# Patient Record
Sex: FEMALE | Race: OTHER | NOT HISPANIC OR LATINO
[De-identification: names, ages, dates, MRNs, and addresses within clinical notes are randomized per-mention and may not be internally consistent; named-entity substitution may affect disease eponyms.]

---

## 2018-10-18 ENCOUNTER — APPOINTMENT (OUTPATIENT)
Dept: ALLERGY | Facility: CLINIC | Age: 78
End: 2018-10-18
Payer: MEDICARE

## 2018-10-18 VITALS
RESPIRATION RATE: 14 BRPM | DIASTOLIC BLOOD PRESSURE: 80 MMHG | HEIGHT: 65.5 IN | SYSTOLIC BLOOD PRESSURE: 140 MMHG | WEIGHT: 149 LBS | HEART RATE: 88 BPM | BODY MASS INDEX: 24.53 KG/M2

## 2018-10-18 PROCEDURE — 95004 PERQ TESTS W/ALRGNC XTRCS: CPT

## 2018-10-18 PROCEDURE — 95018 ALL TSTG PERQ&IQ DRUGS/BIOL: CPT

## 2018-10-18 PROCEDURE — 99205 OFFICE O/P NEW HI 60 MIN: CPT | Mod: 25

## 2018-10-18 RX ORDER — OMEPRAZOLE 20 MG/1
TABLET, DELAYED RELEASE ORAL
Refills: 0 | Status: ACTIVE | COMMUNITY

## 2018-10-18 RX ORDER — ALPRAZOLAM 2 MG/1
TABLET ORAL
Refills: 0 | Status: ACTIVE | COMMUNITY

## 2018-10-22 RX ORDER — MUPIROCIN 20 MG/G
2 OINTMENT TOPICAL
Qty: 150 | Refills: 1 | Status: ACTIVE | COMMUNITY
Start: 2018-10-22 | End: 1900-01-01

## 2018-10-25 ENCOUNTER — APPOINTMENT (OUTPATIENT)
Dept: ALLERGY | Facility: CLINIC | Age: 78
End: 2018-10-25
Payer: MEDICARE

## 2018-10-25 VITALS
HEIGHT: 65.5 IN | SYSTOLIC BLOOD PRESSURE: 136 MMHG | DIASTOLIC BLOOD PRESSURE: 80 MMHG | RESPIRATION RATE: 14 BRPM | BODY MASS INDEX: 24.53 KG/M2 | HEART RATE: 72 BPM | WEIGHT: 149 LBS

## 2018-10-25 PROCEDURE — 31231 NASAL ENDOSCOPY DX: CPT

## 2018-10-25 PROCEDURE — 99214 OFFICE O/P EST MOD 30 MIN: CPT | Mod: 25

## 2018-10-25 RX ORDER — BUDESONIDE 0.5 MG/2ML
0.5 INHALANT ORAL TWICE DAILY
Qty: 60 | Refills: 3 | Status: DISCONTINUED | COMMUNITY
Start: 2018-10-18 | End: 2018-10-25

## 2018-10-31 ENCOUNTER — APPOINTMENT (OUTPATIENT)
Dept: ALLERGY | Facility: CLINIC | Age: 78
End: 2018-10-31
Payer: MEDICARE

## 2018-10-31 VITALS
WEIGHT: 149 LBS | BODY MASS INDEX: 24.53 KG/M2 | HEIGHT: 65.5 IN | RESPIRATION RATE: 14 BRPM | SYSTOLIC BLOOD PRESSURE: 136 MMHG | DIASTOLIC BLOOD PRESSURE: 80 MMHG | HEART RATE: 72 BPM

## 2018-10-31 PROCEDURE — 95024 IQ TESTS W/ALLERGENIC XTRCS: CPT

## 2018-10-31 PROCEDURE — 95018 ALL TSTG PERQ&IQ DRUGS/BIOL: CPT

## 2018-10-31 PROCEDURE — 99214 OFFICE O/P EST MOD 30 MIN: CPT | Mod: 25

## 2019-02-22 ENCOUNTER — OUTPATIENT (OUTPATIENT)
Dept: OUTPATIENT SERVICES | Facility: HOSPITAL | Age: 79
LOS: 1 days | End: 2019-02-22
Payer: MEDICARE

## 2019-02-22 VITALS
DIASTOLIC BLOOD PRESSURE: 84 MMHG | OXYGEN SATURATION: 98 % | HEART RATE: 61 BPM | HEIGHT: 65 IN | WEIGHT: 156.09 LBS | SYSTOLIC BLOOD PRESSURE: 164 MMHG | TEMPERATURE: 98 F | RESPIRATION RATE: 14 BRPM

## 2019-02-22 VITALS
DIASTOLIC BLOOD PRESSURE: 78 MMHG | HEART RATE: 68 BPM | SYSTOLIC BLOOD PRESSURE: 136 MMHG | TEMPERATURE: 99 F | RESPIRATION RATE: 12 BRPM | OXYGEN SATURATION: 94 %

## 2019-02-22 DIAGNOSIS — C91.10 CHRONIC LYMPHOCYTIC LEUKEMIA OF B-CELL TYPE NOT HAVING ACHIEVED REMISSION: ICD-10-CM

## 2019-02-22 PROCEDURE — 96365 THER/PROPH/DIAG IV INF INIT: CPT

## 2019-02-22 PROCEDURE — 96375 TX/PRO/DX INJ NEW DRUG ADDON: CPT

## 2019-02-22 PROCEDURE — 96366 THER/PROPH/DIAG IV INF ADDON: CPT

## 2019-02-22 RX ORDER — ACETAMINOPHEN 500 MG
650 TABLET ORAL ONCE
Qty: 0 | Refills: 0 | Status: COMPLETED | OUTPATIENT
Start: 2019-02-22 | End: 2019-02-22

## 2019-02-22 RX ORDER — IMMUNE GLOBULIN (HUMAN) 10 G/100ML
30 INJECTION INTRAVENOUS; SUBCUTANEOUS ONCE
Qty: 0 | Refills: 0 | Status: COMPLETED | OUTPATIENT
Start: 2019-02-22 | End: 2019-02-22

## 2019-02-22 RX ORDER — DIPHENHYDRAMINE HCL 50 MG
25 CAPSULE ORAL ONCE
Qty: 0 | Refills: 0 | Status: COMPLETED | OUTPATIENT
Start: 2019-02-22 | End: 2019-02-22

## 2019-02-22 RX ADMIN — Medication 25 MILLIGRAM(S): at 10:16

## 2019-02-22 RX ADMIN — IMMUNE GLOBULIN (HUMAN) 75 GRAM(S): 10 INJECTION INTRAVENOUS; SUBCUTANEOUS at 10:38

## 2019-02-22 RX ADMIN — Medication 650 MILLIGRAM(S): at 10:16

## 2019-03-22 ENCOUNTER — OUTPATIENT (OUTPATIENT)
Dept: OUTPATIENT SERVICES | Facility: HOSPITAL | Age: 79
LOS: 1 days | End: 2019-03-22
Payer: MEDICARE

## 2019-03-22 VITALS
WEIGHT: 156.97 LBS | RESPIRATION RATE: 18 BRPM | TEMPERATURE: 98 F | HEIGHT: 65 IN | OXYGEN SATURATION: 100 % | HEART RATE: 63 BPM | DIASTOLIC BLOOD PRESSURE: 90 MMHG | SYSTOLIC BLOOD PRESSURE: 158 MMHG

## 2019-03-22 VITALS
DIASTOLIC BLOOD PRESSURE: 75 MMHG | SYSTOLIC BLOOD PRESSURE: 139 MMHG | OXYGEN SATURATION: 97 % | HEART RATE: 61 BPM | RESPIRATION RATE: 14 BRPM | TEMPERATURE: 98 F

## 2019-03-22 DIAGNOSIS — C91.10 CHRONIC LYMPHOCYTIC LEUKEMIA OF B-CELL TYPE NOT HAVING ACHIEVED REMISSION: ICD-10-CM

## 2019-03-22 PROCEDURE — 96375 TX/PRO/DX INJ NEW DRUG ADDON: CPT

## 2019-03-22 PROCEDURE — 96366 THER/PROPH/DIAG IV INF ADDON: CPT

## 2019-03-22 PROCEDURE — 96365 THER/PROPH/DIAG IV INF INIT: CPT

## 2019-03-22 RX ORDER — IMMUNE GLOBULIN (HUMAN) 10 G/100ML
30 INJECTION INTRAVENOUS; SUBCUTANEOUS ONCE
Qty: 0 | Refills: 0 | Status: COMPLETED | OUTPATIENT
Start: 2019-03-22 | End: 2019-03-22

## 2019-03-22 RX ORDER — DIPHENHYDRAMINE HCL 50 MG
50 CAPSULE ORAL ONCE
Qty: 0 | Refills: 0 | Status: COMPLETED | OUTPATIENT
Start: 2019-03-22 | End: 2019-03-22

## 2019-03-22 RX ORDER — ACETAMINOPHEN 500 MG
650 TABLET ORAL ONCE
Qty: 0 | Refills: 0 | Status: COMPLETED | OUTPATIENT
Start: 2019-03-22 | End: 2019-03-22

## 2019-03-22 RX ADMIN — Medication 102 MILLIGRAM(S): at 10:17

## 2019-03-22 RX ADMIN — IMMUNE GLOBULIN (HUMAN) 30 GRAM(S): 10 INJECTION INTRAVENOUS; SUBCUTANEOUS at 13:57

## 2019-03-22 RX ADMIN — IMMUNE GLOBULIN (HUMAN) 75 GRAM(S): 10 INJECTION INTRAVENOUS; SUBCUTANEOUS at 10:59

## 2019-03-22 RX ADMIN — Medication 650 MILLIGRAM(S): at 10:17

## 2019-03-22 RX ADMIN — Medication 50 MILLIGRAM(S): at 10:47

## 2019-04-19 ENCOUNTER — OUTPATIENT (OUTPATIENT)
Dept: OUTPATIENT SERVICES | Facility: HOSPITAL | Age: 79
LOS: 1 days | End: 2019-04-19
Payer: MEDICARE

## 2019-04-19 VITALS
RESPIRATION RATE: 12 BRPM | OXYGEN SATURATION: 97 % | HEART RATE: 67 BPM | TEMPERATURE: 98 F | DIASTOLIC BLOOD PRESSURE: 77 MMHG | SYSTOLIC BLOOD PRESSURE: 133 MMHG

## 2019-04-19 VITALS
OXYGEN SATURATION: 96 % | HEART RATE: 66 BPM | DIASTOLIC BLOOD PRESSURE: 82 MMHG | WEIGHT: 154.98 LBS | HEIGHT: 65 IN | RESPIRATION RATE: 15 BRPM | SYSTOLIC BLOOD PRESSURE: 145 MMHG | TEMPERATURE: 98 F

## 2019-04-19 DIAGNOSIS — C91.10 CHRONIC LYMPHOCYTIC LEUKEMIA OF B-CELL TYPE NOT HAVING ACHIEVED REMISSION: ICD-10-CM

## 2019-04-19 PROCEDURE — 96375 TX/PRO/DX INJ NEW DRUG ADDON: CPT

## 2019-04-19 PROCEDURE — 96366 THER/PROPH/DIAG IV INF ADDON: CPT

## 2019-04-19 PROCEDURE — 96365 THER/PROPH/DIAG IV INF INIT: CPT

## 2019-04-19 RX ORDER — ACETAMINOPHEN 500 MG
650 TABLET ORAL ONCE
Qty: 0 | Refills: 0 | Status: DISCONTINUED | OUTPATIENT
Start: 2019-04-19 | End: 2019-04-19

## 2019-04-19 RX ORDER — IMMUNE GLOBULIN (HUMAN) 10 G/100ML
30 INJECTION INTRAVENOUS; SUBCUTANEOUS ONCE
Qty: 0 | Refills: 0 | Status: DISCONTINUED | OUTPATIENT
Start: 2019-04-19 | End: 2019-04-19

## 2019-04-19 RX ORDER — IMMUNE GLOBULIN (HUMAN) 10 G/100ML
30 INJECTION INTRAVENOUS; SUBCUTANEOUS ONCE
Qty: 0 | Refills: 0 | Status: COMPLETED | OUTPATIENT
Start: 2019-04-19 | End: 2019-04-19

## 2019-04-19 RX ORDER — DIPHENHYDRAMINE HCL 50 MG
25 CAPSULE ORAL ONCE
Qty: 0 | Refills: 0 | Status: DISCONTINUED | OUTPATIENT
Start: 2019-04-19 | End: 2019-04-19

## 2019-04-19 RX ORDER — ACETAMINOPHEN 500 MG
650 TABLET ORAL ONCE
Qty: 0 | Refills: 0 | Status: COMPLETED | OUTPATIENT
Start: 2019-04-19 | End: 2019-04-19

## 2019-04-19 RX ORDER — DIPHENHYDRAMINE HCL 50 MG
25 CAPSULE ORAL ONCE
Qty: 0 | Refills: 0 | Status: COMPLETED | OUTPATIENT
Start: 2019-04-19 | End: 2019-04-19

## 2019-04-19 RX ADMIN — IMMUNE GLOBULIN (HUMAN) 75 GRAM(S): 10 INJECTION INTRAVENOUS; SUBCUTANEOUS at 10:35

## 2019-04-19 RX ADMIN — Medication 650 MILLIGRAM(S): at 10:09

## 2019-04-19 RX ADMIN — Medication 25 MILLIGRAM(S): at 10:09

## 2019-04-19 RX ADMIN — IMMUNE GLOBULIN (HUMAN) 30 GRAM(S): 10 INJECTION INTRAVENOUS; SUBCUTANEOUS at 13:23

## 2019-05-17 ENCOUNTER — OUTPATIENT (OUTPATIENT)
Dept: OUTPATIENT SERVICES | Facility: HOSPITAL | Age: 79
LOS: 1 days | End: 2019-05-17
Payer: MEDICARE

## 2019-05-17 VITALS
HEART RATE: 67 BPM | DIASTOLIC BLOOD PRESSURE: 82 MMHG | WEIGHT: 154.1 LBS | OXYGEN SATURATION: 96 % | HEIGHT: 66 IN | RESPIRATION RATE: 14 BRPM | SYSTOLIC BLOOD PRESSURE: 146 MMHG | TEMPERATURE: 98 F

## 2019-05-17 VITALS
SYSTOLIC BLOOD PRESSURE: 137 MMHG | RESPIRATION RATE: 14 BRPM | TEMPERATURE: 98 F | OXYGEN SATURATION: 97 % | HEART RATE: 68 BPM | DIASTOLIC BLOOD PRESSURE: 75 MMHG

## 2019-05-17 DIAGNOSIS — C91.10 CHRONIC LYMPHOCYTIC LEUKEMIA OF B-CELL TYPE NOT HAVING ACHIEVED REMISSION: ICD-10-CM

## 2019-05-17 PROCEDURE — 96375 TX/PRO/DX INJ NEW DRUG ADDON: CPT

## 2019-05-17 PROCEDURE — 96365 THER/PROPH/DIAG IV INF INIT: CPT

## 2019-05-17 PROCEDURE — 96366 THER/PROPH/DIAG IV INF ADDON: CPT

## 2019-05-17 RX ORDER — DIPHENHYDRAMINE HCL 50 MG
25 CAPSULE ORAL ONCE
Refills: 0 | Status: COMPLETED | OUTPATIENT
Start: 2019-05-17 | End: 2019-05-17

## 2019-05-17 RX ORDER — ACETAMINOPHEN 500 MG
650 TABLET ORAL ONCE
Refills: 0 | Status: COMPLETED | OUTPATIENT
Start: 2019-05-17 | End: 2019-05-17

## 2019-05-17 RX ORDER — IMMUNE GLOBULIN (HUMAN) 10 G/100ML
30 INJECTION INTRAVENOUS; SUBCUTANEOUS ONCE
Refills: 0 | Status: COMPLETED | OUTPATIENT
Start: 2019-05-17 | End: 2019-05-17

## 2019-05-17 RX ADMIN — IMMUNE GLOBULIN (HUMAN) 75 GRAM(S): 10 INJECTION INTRAVENOUS; SUBCUTANEOUS at 11:09

## 2019-05-17 RX ADMIN — Medication 25 MILLIGRAM(S): at 10:59

## 2019-05-17 RX ADMIN — Medication 650 MILLIGRAM(S): at 10:59

## 2019-05-17 RX ADMIN — IMMUNE GLOBULIN (HUMAN) 30 GRAM(S): 10 INJECTION INTRAVENOUS; SUBCUTANEOUS at 13:00

## 2019-06-14 ENCOUNTER — OUTPATIENT (OUTPATIENT)
Dept: OUTPATIENT SERVICES | Facility: HOSPITAL | Age: 79
LOS: 1 days | End: 2019-06-14
Payer: MEDICARE

## 2019-06-14 VITALS
HEART RATE: 59 BPM | RESPIRATION RATE: 12 BRPM | OXYGEN SATURATION: 98 % | DIASTOLIC BLOOD PRESSURE: 92 MMHG | SYSTOLIC BLOOD PRESSURE: 167 MMHG | TEMPERATURE: 98 F

## 2019-06-14 VITALS
RESPIRATION RATE: 16 BRPM | HEART RATE: 57 BPM | OXYGEN SATURATION: 98 % | SYSTOLIC BLOOD PRESSURE: 163 MMHG | TEMPERATURE: 98 F | DIASTOLIC BLOOD PRESSURE: 89 MMHG

## 2019-06-14 DIAGNOSIS — C91.10 CHRONIC LYMPHOCYTIC LEUKEMIA OF B-CELL TYPE NOT HAVING ACHIEVED REMISSION: ICD-10-CM

## 2019-06-14 PROCEDURE — 96366 THER/PROPH/DIAG IV INF ADDON: CPT

## 2019-06-14 PROCEDURE — 96365 THER/PROPH/DIAG IV INF INIT: CPT

## 2019-06-14 PROCEDURE — 96375 TX/PRO/DX INJ NEW DRUG ADDON: CPT

## 2019-06-14 RX ORDER — IMMUNE GLOBULIN (HUMAN) 10 G/100ML
30 INJECTION INTRAVENOUS; SUBCUTANEOUS ONCE
Refills: 0 | Status: COMPLETED | OUTPATIENT
Start: 2019-06-14 | End: 2019-06-14

## 2019-06-14 RX ORDER — ACETAMINOPHEN 500 MG
650 TABLET ORAL ONCE
Refills: 0 | Status: COMPLETED | OUTPATIENT
Start: 2019-06-14 | End: 2019-06-14

## 2019-06-14 RX ORDER — DIPHENHYDRAMINE HCL 50 MG
25 CAPSULE ORAL ONCE
Refills: 0 | Status: COMPLETED | OUTPATIENT
Start: 2019-06-14 | End: 2019-06-14

## 2019-06-14 RX ADMIN — IMMUNE GLOBULIN (HUMAN) 75 GRAM(S): 10 INJECTION INTRAVENOUS; SUBCUTANEOUS at 11:03

## 2019-06-14 RX ADMIN — Medication 650 MILLIGRAM(S): at 10:55

## 2019-06-14 RX ADMIN — Medication 25 MILLIGRAM(S): at 10:55

## 2019-06-14 RX ADMIN — IMMUNE GLOBULIN (HUMAN) 30 GRAM(S): 10 INJECTION INTRAVENOUS; SUBCUTANEOUS at 14:30

## 2019-08-09 ENCOUNTER — OUTPATIENT (OUTPATIENT)
Dept: OUTPATIENT SERVICES | Facility: HOSPITAL | Age: 79
LOS: 1 days | End: 2019-08-09
Payer: MEDICARE

## 2019-08-09 VITALS
HEART RATE: 61 BPM | OXYGEN SATURATION: 95 % | TEMPERATURE: 98 F | WEIGHT: 158.07 LBS | SYSTOLIC BLOOD PRESSURE: 144 MMHG | DIASTOLIC BLOOD PRESSURE: 80 MMHG | RESPIRATION RATE: 14 BRPM | HEIGHT: 66 IN

## 2019-08-09 VITALS
HEART RATE: 63 BPM | OXYGEN SATURATION: 97 % | DIASTOLIC BLOOD PRESSURE: 74 MMHG | RESPIRATION RATE: 12 BRPM | SYSTOLIC BLOOD PRESSURE: 132 MMHG | TEMPERATURE: 98 F

## 2019-08-09 DIAGNOSIS — C91.10 CHRONIC LYMPHOCYTIC LEUKEMIA OF B-CELL TYPE NOT HAVING ACHIEVED REMISSION: ICD-10-CM

## 2019-08-09 PROCEDURE — 96413 CHEMO IV INFUSION 1 HR: CPT

## 2019-08-09 PROCEDURE — 96415 CHEMO IV INFUSION ADDL HR: CPT

## 2019-08-09 RX ORDER — IMMUNE GLOBULIN (HUMAN) 10 G/100ML
30 INJECTION INTRAVENOUS; SUBCUTANEOUS ONCE
Refills: 0 | Status: COMPLETED | OUTPATIENT
Start: 2019-08-09 | End: 2019-08-09

## 2019-08-09 RX ORDER — ACETAMINOPHEN 500 MG
650 TABLET ORAL ONCE
Refills: 0 | Status: COMPLETED | OUTPATIENT
Start: 2019-08-09 | End: 2019-08-09

## 2019-08-09 RX ORDER — DIPHENHYDRAMINE HCL 50 MG
25 CAPSULE ORAL ONCE
Refills: 0 | Status: COMPLETED | OUTPATIENT
Start: 2019-08-09 | End: 2019-08-09

## 2019-08-09 RX ADMIN — Medication 25 MILLIGRAM(S): at 10:33

## 2019-08-09 RX ADMIN — IMMUNE GLOBULIN (HUMAN) 50 GRAM(S): 10 INJECTION INTRAVENOUS; SUBCUTANEOUS at 11:08

## 2019-08-09 RX ADMIN — IMMUNE GLOBULIN (HUMAN) 30 GRAM(S): 10 INJECTION INTRAVENOUS; SUBCUTANEOUS at 14:18

## 2019-08-09 RX ADMIN — Medication 650 MILLIGRAM(S): at 10:33

## 2019-09-06 ENCOUNTER — OUTPATIENT (OUTPATIENT)
Dept: OUTPATIENT SERVICES | Facility: HOSPITAL | Age: 79
LOS: 1 days | End: 2019-09-06
Payer: MEDICARE

## 2019-09-06 VITALS
HEART RATE: 58 BPM | RESPIRATION RATE: 14 BRPM | DIASTOLIC BLOOD PRESSURE: 81 MMHG | SYSTOLIC BLOOD PRESSURE: 148 MMHG | OXYGEN SATURATION: 99 % | TEMPERATURE: 98 F

## 2019-09-06 VITALS
HEIGHT: 66 IN | OXYGEN SATURATION: 97 % | WEIGHT: 158.07 LBS | TEMPERATURE: 98 F | SYSTOLIC BLOOD PRESSURE: 149 MMHG | RESPIRATION RATE: 16 BRPM | HEART RATE: 64 BPM | DIASTOLIC BLOOD PRESSURE: 81 MMHG

## 2019-09-06 DIAGNOSIS — C91.10 CHRONIC LYMPHOCYTIC LEUKEMIA OF B-CELL TYPE NOT HAVING ACHIEVED REMISSION: ICD-10-CM

## 2019-09-06 PROCEDURE — 96365 THER/PROPH/DIAG IV INF INIT: CPT

## 2019-09-06 PROCEDURE — 96366 THER/PROPH/DIAG IV INF ADDON: CPT

## 2019-09-06 RX ORDER — DIPHENHYDRAMINE HCL 50 MG
25 CAPSULE ORAL ONCE
Refills: 0 | Status: COMPLETED | OUTPATIENT
Start: 2019-09-06 | End: 2019-09-06

## 2019-09-06 RX ORDER — ACETAMINOPHEN 500 MG
650 TABLET ORAL ONCE
Refills: 0 | Status: COMPLETED | OUTPATIENT
Start: 2019-09-06 | End: 2019-09-06

## 2019-09-06 RX ORDER — IMMUNE GLOBULIN (HUMAN) 10 G/100ML
30 INJECTION INTRAVENOUS; SUBCUTANEOUS ONCE
Refills: 0 | Status: COMPLETED | OUTPATIENT
Start: 2019-09-06 | End: 2019-09-06

## 2019-09-06 RX ADMIN — Medication 650 MILLIGRAM(S): at 11:12

## 2019-09-06 RX ADMIN — IMMUNE GLOBULIN (HUMAN) 30 GRAM(S): 10 INJECTION INTRAVENOUS; SUBCUTANEOUS at 14:03

## 2019-09-06 RX ADMIN — IMMUNE GLOBULIN (HUMAN) 50 GRAM(S): 10 INJECTION INTRAVENOUS; SUBCUTANEOUS at 11:28

## 2019-09-06 RX ADMIN — Medication 25 MILLIGRAM(S): at 11:12

## 2019-10-04 ENCOUNTER — OUTPATIENT (OUTPATIENT)
Dept: OUTPATIENT SERVICES | Facility: HOSPITAL | Age: 79
LOS: 1 days | End: 2019-10-04
Payer: MEDICARE

## 2019-10-04 VITALS
SYSTOLIC BLOOD PRESSURE: 128 MMHG | OXYGEN SATURATION: 96 % | RESPIRATION RATE: 12 BRPM | HEART RATE: 61 BPM | TEMPERATURE: 98 F | DIASTOLIC BLOOD PRESSURE: 70 MMHG

## 2019-10-04 VITALS
OXYGEN SATURATION: 98 % | TEMPERATURE: 98 F | DIASTOLIC BLOOD PRESSURE: 83 MMHG | HEART RATE: 61 BPM | SYSTOLIC BLOOD PRESSURE: 147 MMHG | HEIGHT: 66 IN | RESPIRATION RATE: 14 BRPM | WEIGHT: 156.09 LBS

## 2019-10-04 DIAGNOSIS — C91.10 CHRONIC LYMPHOCYTIC LEUKEMIA OF B-CELL TYPE NOT HAVING ACHIEVED REMISSION: ICD-10-CM

## 2019-10-04 PROCEDURE — 96365 THER/PROPH/DIAG IV INF INIT: CPT

## 2019-10-04 PROCEDURE — 96366 THER/PROPH/DIAG IV INF ADDON: CPT

## 2019-10-04 RX ORDER — ACETAMINOPHEN 500 MG
650 TABLET ORAL ONCE
Refills: 0 | Status: COMPLETED | OUTPATIENT
Start: 2019-10-04 | End: 2019-10-04

## 2019-10-04 RX ORDER — DIPHENHYDRAMINE HCL 50 MG
25 CAPSULE ORAL ONCE
Refills: 0 | Status: COMPLETED | OUTPATIENT
Start: 2019-10-04 | End: 2019-10-04

## 2019-10-04 RX ORDER — IMMUNE GLOBULIN (HUMAN) 10 G/100ML
30 INJECTION INTRAVENOUS; SUBCUTANEOUS ONCE
Refills: 0 | Status: COMPLETED | OUTPATIENT
Start: 2019-10-04 | End: 2019-10-04

## 2019-10-04 RX ADMIN — IMMUNE GLOBULIN (HUMAN) 50 GRAM(S): 10 INJECTION INTRAVENOUS; SUBCUTANEOUS at 10:48

## 2019-10-04 RX ADMIN — IMMUNE GLOBULIN (HUMAN) 30 GRAM(S): 10 INJECTION INTRAVENOUS; SUBCUTANEOUS at 14:10

## 2019-10-04 RX ADMIN — Medication 25 MILLIGRAM(S): at 10:34

## 2019-10-04 RX ADMIN — Medication 650 MILLIGRAM(S): at 10:34

## 2019-11-01 ENCOUNTER — OUTPATIENT (OUTPATIENT)
Dept: OUTPATIENT SERVICES | Facility: HOSPITAL | Age: 79
LOS: 1 days | End: 2019-11-01
Payer: MEDICARE

## 2019-11-01 VITALS
OXYGEN SATURATION: 99 % | HEART RATE: 76 BPM | SYSTOLIC BLOOD PRESSURE: 164 MMHG | RESPIRATION RATE: 16 BRPM | TEMPERATURE: 98 F | WEIGHT: 154.98 LBS | DIASTOLIC BLOOD PRESSURE: 95 MMHG | HEIGHT: 66 IN

## 2019-11-01 VITALS
DIASTOLIC BLOOD PRESSURE: 78 MMHG | RESPIRATION RATE: 14 BRPM | TEMPERATURE: 98 F | SYSTOLIC BLOOD PRESSURE: 135 MMHG | OXYGEN SATURATION: 95 % | HEART RATE: 66 BPM

## 2019-11-01 DIAGNOSIS — C91.10 CHRONIC LYMPHOCYTIC LEUKEMIA OF B-CELL TYPE NOT HAVING ACHIEVED REMISSION: ICD-10-CM

## 2019-11-01 PROCEDURE — 96365 THER/PROPH/DIAG IV INF INIT: CPT

## 2019-11-01 PROCEDURE — 96366 THER/PROPH/DIAG IV INF ADDON: CPT

## 2019-11-01 RX ORDER — ACETAMINOPHEN 500 MG
650 TABLET ORAL ONCE
Refills: 0 | Status: COMPLETED | OUTPATIENT
Start: 2019-11-01 | End: 2019-11-01

## 2019-11-01 RX ORDER — DIPHENHYDRAMINE HCL 50 MG
25 CAPSULE ORAL ONCE
Refills: 0 | Status: COMPLETED | OUTPATIENT
Start: 2019-11-01 | End: 2019-11-01

## 2019-11-01 RX ORDER — IMMUNE GLOBULIN (HUMAN) 10 G/100ML
30 INJECTION INTRAVENOUS; SUBCUTANEOUS ONCE
Refills: 0 | Status: COMPLETED | OUTPATIENT
Start: 2019-11-01 | End: 2019-11-01

## 2019-11-01 RX ADMIN — Medication 650 MILLIGRAM(S): at 10:14

## 2019-11-01 RX ADMIN — IMMUNE GLOBULIN (HUMAN) 30 GRAM(S): 10 INJECTION INTRAVENOUS; SUBCUTANEOUS at 13:38

## 2019-11-01 RX ADMIN — IMMUNE GLOBULIN (HUMAN) 75 GRAM(S): 10 INJECTION INTRAVENOUS; SUBCUTANEOUS at 10:23

## 2019-11-01 RX ADMIN — Medication 25 MILLIGRAM(S): at 10:14

## 2019-11-29 ENCOUNTER — OUTPATIENT (OUTPATIENT)
Dept: OUTPATIENT SERVICES | Facility: HOSPITAL | Age: 79
LOS: 1 days | End: 2019-11-29
Payer: MEDICARE

## 2019-11-29 VITALS
HEIGHT: 66 IN | OXYGEN SATURATION: 98 % | DIASTOLIC BLOOD PRESSURE: 88 MMHG | HEART RATE: 62 BPM | WEIGHT: 156.97 LBS | RESPIRATION RATE: 12 BRPM | SYSTOLIC BLOOD PRESSURE: 158 MMHG

## 2019-11-29 VITALS
OXYGEN SATURATION: 96 % | HEART RATE: 63 BPM | SYSTOLIC BLOOD PRESSURE: 151 MMHG | DIASTOLIC BLOOD PRESSURE: 84 MMHG | RESPIRATION RATE: 12 BRPM | TEMPERATURE: 98 F

## 2019-11-29 DIAGNOSIS — C91.10 CHRONIC LYMPHOCYTIC LEUKEMIA OF B-CELL TYPE NOT HAVING ACHIEVED REMISSION: ICD-10-CM

## 2019-11-29 PROCEDURE — 96415 CHEMO IV INFUSION ADDL HR: CPT

## 2019-11-29 PROCEDURE — 96413 CHEMO IV INFUSION 1 HR: CPT

## 2019-11-29 RX ORDER — IMMUNE GLOBULIN (HUMAN) 10 G/100ML
30 INJECTION INTRAVENOUS; SUBCUTANEOUS ONCE
Refills: 0 | Status: COMPLETED | OUTPATIENT
Start: 2019-11-29 | End: 2019-11-29

## 2019-11-29 RX ORDER — DIPHENHYDRAMINE HCL 50 MG
25 CAPSULE ORAL ONCE
Refills: 0 | Status: COMPLETED | OUTPATIENT
Start: 2019-11-29 | End: 2019-11-29

## 2019-11-29 RX ORDER — ACETAMINOPHEN 500 MG
650 TABLET ORAL ONCE
Refills: 0 | Status: COMPLETED | OUTPATIENT
Start: 2019-11-29 | End: 2019-11-29

## 2019-11-29 RX ADMIN — IMMUNE GLOBULIN (HUMAN) 75 GRAM(S): 10 INJECTION INTRAVENOUS; SUBCUTANEOUS at 11:44

## 2019-11-29 RX ADMIN — Medication 25 MILLIGRAM(S): at 11:20

## 2019-11-29 RX ADMIN — IMMUNE GLOBULIN (HUMAN) 30 GRAM(S): 10 INJECTION INTRAVENOUS; SUBCUTANEOUS at 14:36

## 2019-11-29 RX ADMIN — Medication 650 MILLIGRAM(S): at 11:20

## 2019-12-27 ENCOUNTER — OUTPATIENT (OUTPATIENT)
Dept: OUTPATIENT SERVICES | Facility: HOSPITAL | Age: 79
LOS: 1 days | End: 2019-12-27
Payer: MEDICARE

## 2019-12-27 VITALS
RESPIRATION RATE: 16 BRPM | DIASTOLIC BLOOD PRESSURE: 75 MMHG | HEIGHT: 66 IN | HEART RATE: 71 BPM | OXYGEN SATURATION: 99 % | TEMPERATURE: 98 F | SYSTOLIC BLOOD PRESSURE: 131 MMHG | WEIGHT: 162.26 LBS

## 2019-12-27 VITALS
TEMPERATURE: 98 F | DIASTOLIC BLOOD PRESSURE: 77 MMHG | HEART RATE: 66 BPM | RESPIRATION RATE: 16 BRPM | SYSTOLIC BLOOD PRESSURE: 129 MMHG | OXYGEN SATURATION: 96 %

## 2019-12-27 DIAGNOSIS — C91.10 CHRONIC LYMPHOCYTIC LEUKEMIA OF B-CELL TYPE NOT HAVING ACHIEVED REMISSION: ICD-10-CM

## 2019-12-27 PROCEDURE — 96415 CHEMO IV INFUSION ADDL HR: CPT

## 2019-12-27 PROCEDURE — 96413 CHEMO IV INFUSION 1 HR: CPT

## 2019-12-27 RX ORDER — ACETAMINOPHEN 500 MG
650 TABLET ORAL ONCE
Refills: 0 | Status: COMPLETED | OUTPATIENT
Start: 2019-12-27 | End: 2019-12-27

## 2019-12-27 RX ORDER — DIPHENHYDRAMINE HCL 50 MG
50 CAPSULE ORAL ONCE
Refills: 0 | Status: COMPLETED | OUTPATIENT
Start: 2019-12-27 | End: 2019-12-27

## 2019-12-27 RX ORDER — IMMUNE GLOBULIN (HUMAN) 10 G/100ML
30 INJECTION INTRAVENOUS; SUBCUTANEOUS ONCE
Refills: 0 | Status: COMPLETED | OUTPATIENT
Start: 2019-12-27 | End: 2019-12-27

## 2019-12-27 RX ADMIN — Medication 650 MILLIGRAM(S): at 11:13

## 2019-12-27 RX ADMIN — IMMUNE GLOBULIN (HUMAN) 100 GRAM(S): 10 INJECTION INTRAVENOUS; SUBCUTANEOUS at 11:48

## 2019-12-27 RX ADMIN — IMMUNE GLOBULIN (HUMAN) 30 GRAM(S): 10 INJECTION INTRAVENOUS; SUBCUTANEOUS at 14:38

## 2019-12-27 RX ADMIN — Medication 50 MILLIGRAM(S): at 11:42

## 2019-12-27 RX ADMIN — Medication 102 MILLIGRAM(S): at 11:12

## 2019-12-27 NOTE — PROVIDER CONTACT NOTE (OTHER) - ASSESSMENT
Pt premedicated with Benadryl IVPB. Patient complained of some burning at IV site. hep lock intact . No redness or swelling noted. pt stated it was Benadryl. Ice pack applied. Pt tolerated well.

## 2020-01-24 ENCOUNTER — OUTPATIENT (OUTPATIENT)
Dept: OUTPATIENT SERVICES | Facility: HOSPITAL | Age: 80
LOS: 1 days | End: 2020-01-24
Payer: MEDICARE

## 2020-01-24 VITALS
SYSTOLIC BLOOD PRESSURE: 142 MMHG | TEMPERATURE: 98 F | WEIGHT: 160.06 LBS | OXYGEN SATURATION: 98 % | HEIGHT: 66 IN | DIASTOLIC BLOOD PRESSURE: 86 MMHG | RESPIRATION RATE: 15 BRPM | HEART RATE: 64 BPM

## 2020-01-24 VITALS
HEART RATE: 61 BPM | DIASTOLIC BLOOD PRESSURE: 81 MMHG | RESPIRATION RATE: 12 BRPM | SYSTOLIC BLOOD PRESSURE: 142 MMHG | OXYGEN SATURATION: 97 %

## 2020-01-24 DIAGNOSIS — C91.10 CHRONIC LYMPHOCYTIC LEUKEMIA OF B-CELL TYPE NOT HAVING ACHIEVED REMISSION: ICD-10-CM

## 2020-01-24 PROCEDURE — 96366 THER/PROPH/DIAG IV INF ADDON: CPT

## 2020-01-24 PROCEDURE — 96375 TX/PRO/DX INJ NEW DRUG ADDON: CPT

## 2020-01-24 PROCEDURE — 96365 THER/PROPH/DIAG IV INF INIT: CPT

## 2020-01-24 RX ORDER — DIPHENHYDRAMINE HCL 50 MG
25 CAPSULE ORAL ONCE
Refills: 0 | Status: COMPLETED | OUTPATIENT
Start: 2020-01-24 | End: 2020-01-24

## 2020-01-24 RX ORDER — ACETAMINOPHEN 500 MG
650 TABLET ORAL ONCE
Refills: 0 | Status: COMPLETED | OUTPATIENT
Start: 2020-01-24 | End: 2020-01-24

## 2020-01-24 RX ORDER — IMMUNE GLOBULIN (HUMAN) 10 G/100ML
30 INJECTION INTRAVENOUS; SUBCUTANEOUS ONCE
Refills: 0 | Status: COMPLETED | OUTPATIENT
Start: 2020-01-24 | End: 2020-01-24

## 2020-01-24 RX ADMIN — Medication 650 MILLIGRAM(S): at 10:03

## 2020-01-24 RX ADMIN — IMMUNE GLOBULIN (HUMAN) 75 GRAM(S): 10 INJECTION INTRAVENOUS; SUBCUTANEOUS at 10:20

## 2020-01-24 RX ADMIN — IMMUNE GLOBULIN (HUMAN) 30 GRAM(S): 10 INJECTION INTRAVENOUS; SUBCUTANEOUS at 14:25

## 2020-01-24 RX ADMIN — Medication 25 MILLIGRAM(S): at 10:02

## 2020-02-21 ENCOUNTER — OUTPATIENT (OUTPATIENT)
Dept: OUTPATIENT SERVICES | Facility: HOSPITAL | Age: 80
LOS: 1 days | End: 2020-02-21
Payer: MEDICARE

## 2020-02-21 VITALS
HEIGHT: 66 IN | HEART RATE: 70 BPM | SYSTOLIC BLOOD PRESSURE: 144 MMHG | RESPIRATION RATE: 14 BRPM | DIASTOLIC BLOOD PRESSURE: 84 MMHG | OXYGEN SATURATION: 97 % | WEIGHT: 158.95 LBS | TEMPERATURE: 98 F

## 2020-02-21 VITALS
HEART RATE: 64 BPM | RESPIRATION RATE: 12 BRPM | SYSTOLIC BLOOD PRESSURE: 130 MMHG | DIASTOLIC BLOOD PRESSURE: 75 MMHG | TEMPERATURE: 98 F | OXYGEN SATURATION: 96 %

## 2020-02-21 DIAGNOSIS — C91.10 CHRONIC LYMPHOCYTIC LEUKEMIA OF B-CELL TYPE NOT HAVING ACHIEVED REMISSION: ICD-10-CM

## 2020-02-21 PROCEDURE — 96375 TX/PRO/DX INJ NEW DRUG ADDON: CPT

## 2020-02-21 PROCEDURE — 96365 THER/PROPH/DIAG IV INF INIT: CPT

## 2020-02-21 PROCEDURE — 96366 THER/PROPH/DIAG IV INF ADDON: CPT

## 2020-02-21 RX ORDER — ALPRAZOLAM 0.25 MG
1 TABLET ORAL
Qty: 0 | Refills: 0 | DISCHARGE

## 2020-02-21 RX ORDER — ACETAMINOPHEN 500 MG
650 TABLET ORAL ONCE
Refills: 0 | Status: COMPLETED | OUTPATIENT
Start: 2020-02-21 | End: 2020-02-21

## 2020-02-21 RX ORDER — LETROZOLE 2.5 MG/1
1 TABLET, FILM COATED ORAL
Qty: 0 | Refills: 0 | DISCHARGE

## 2020-02-21 RX ORDER — IMMUNE GLOBULIN (HUMAN) 10 G/100ML
30 INJECTION INTRAVENOUS; SUBCUTANEOUS ONCE
Refills: 0 | Status: COMPLETED | OUTPATIENT
Start: 2020-02-21 | End: 2020-02-21

## 2020-02-21 RX ORDER — DIPHENHYDRAMINE HCL 50 MG
25 CAPSULE ORAL ONCE
Refills: 0 | Status: COMPLETED | OUTPATIENT
Start: 2020-02-21 | End: 2020-02-21

## 2020-02-21 RX ADMIN — Medication 650 MILLIGRAM(S): at 10:48

## 2020-02-21 RX ADMIN — IMMUNE GLOBULIN (HUMAN) 30 GRAM(S): 10 INJECTION INTRAVENOUS; SUBCUTANEOUS at 14:25

## 2020-02-21 RX ADMIN — IMMUNE GLOBULIN (HUMAN) 75 GRAM(S): 10 INJECTION INTRAVENOUS; SUBCUTANEOUS at 10:59

## 2020-02-21 RX ADMIN — Medication 25 MILLIGRAM(S): at 10:48

## 2020-12-11 ENCOUNTER — OUTPATIENT (OUTPATIENT)
Dept: OUTPATIENT SERVICES | Facility: HOSPITAL | Age: 80
LOS: 1 days | Discharge: HOME | End: 2020-12-11

## 2020-12-11 DIAGNOSIS — C92.00 ACUTE MYELOBLASTIC LEUKEMIA, NOT HAVING ACHIEVED REMISSION: ICD-10-CM

## 2020-12-15 ENCOUNTER — APPOINTMENT (OUTPATIENT)
Dept: HEMATOLOGY ONCOLOGY | Facility: CLINIC | Age: 80
End: 2020-12-15
Payer: MEDICARE

## 2020-12-15 DIAGNOSIS — D80.1 NONFAMILIAL HYPOGAMMAGLOBULINEMIA: ICD-10-CM

## 2020-12-15 DIAGNOSIS — C50.912 MALIGNANT NEOPLASM OF UNSPECIFIED SITE OF LEFT FEMALE BREAST: ICD-10-CM

## 2020-12-15 DIAGNOSIS — T45.1X5S ACUTE MYELOBLASTIC LEUKEMIA, NOT HAVING ACHIEVED REMISSION: ICD-10-CM

## 2020-12-15 DIAGNOSIS — Z78.9 OTHER SPECIFIED HEALTH STATUS: ICD-10-CM

## 2020-12-15 DIAGNOSIS — Z63.5 DISRUPTION OF FAMILY BY SEPARATION AND DIVORCE: ICD-10-CM

## 2020-12-15 DIAGNOSIS — Z80.41 FAMILY HISTORY OF MALIGNANT NEOPLASM OF OVARY: ICD-10-CM

## 2020-12-15 DIAGNOSIS — Z92.21 PERSONAL HISTORY OF ANTINEOPLASTIC CHEMOTHERAPY: ICD-10-CM

## 2020-12-15 DIAGNOSIS — Z87.891 PERSONAL HISTORY OF NICOTINE DEPENDENCE: ICD-10-CM

## 2020-12-15 DIAGNOSIS — Z17.0 MALIGNANT NEOPLASM OF UNSPECIFIED SITE OF LEFT FEMALE BREAST: ICD-10-CM

## 2020-12-15 DIAGNOSIS — C91.10 CHRONIC LYMPHOCYTIC LEUKEMIA OF B-CELL TYPE NOT HAVING ACHIEVED REMISSION: ICD-10-CM

## 2020-12-15 DIAGNOSIS — C92.00 ACUTE MYELOBLASTIC LEUKEMIA, NOT HAVING ACHIEVED REMISSION: ICD-10-CM

## 2020-12-15 DIAGNOSIS — Z92.3 PERSONAL HISTORY OF IRRADIATION: ICD-10-CM

## 2020-12-15 DIAGNOSIS — J32.9 CHRONIC SINUSITIS, UNSPECIFIED: ICD-10-CM

## 2020-12-15 PROCEDURE — 99205 OFFICE O/P NEW HI 60 MIN: CPT | Mod: 95

## 2020-12-15 SDOH — SOCIAL STABILITY - SOCIAL INSECURITY: DISRUPTION OF FAMILY BY SEPARATION AND DIVORCE: Z63.5

## 2020-12-16 PROBLEM — J32.9 CHRONIC RECURRENT SINUSITIS: Status: ACTIVE | Noted: 2018-10-18

## 2020-12-16 PROBLEM — C50.912 MALIGNANT NEOPLASM OF LEFT BREAST IN FEMALE, ESTROGEN RECEPTOR POSITIVE, UNSPECIFIED SITE OF BREAST: Status: ACTIVE | Noted: 2020-12-16

## 2020-12-16 PROBLEM — Z92.21 HISTORY OF CHEMOTHERAPY: Status: ACTIVE | Noted: 2020-12-16

## 2020-12-16 PROBLEM — C91.10 CHRONIC LYMPHOCYTIC LEUKEMIA: Status: ACTIVE | Noted: 2020-12-16

## 2020-12-16 PROBLEM — C92.00: Status: ACTIVE | Noted: 2020-12-16

## 2020-12-16 RX ORDER — VENETOCLAX 10-50-100
10 & 50 & 100 KIT ORAL
Qty: 42 | Refills: 0 | Status: ACTIVE | COMMUNITY
Start: 2020-12-04

## 2020-12-16 RX ORDER — MUPIROCIN 2 G/100G
2 CREAM TOPICAL
Qty: 5 | Refills: 1 | Status: DISCONTINUED | COMMUNITY
Start: 2018-10-18 | End: 2020-12-16

## 2020-12-16 RX ORDER — ATORVASTATIN CALCIUM 10 MG/1
10 TABLET, FILM COATED ORAL
Qty: 90 | Refills: 0 | Status: DISCONTINUED | COMMUNITY
Start: 2020-08-28

## 2020-12-16 RX ORDER — LETROZOLE TABLETS 2.5 MG/1
TABLET, FILM COATED ORAL
Refills: 0 | Status: DISCONTINUED | COMMUNITY
End: 2020-12-16

## 2020-12-16 RX ORDER — PROCHLORPERAZINE MALEATE 5 MG/1
5 TABLET ORAL
Qty: 100 | Refills: 0 | Status: ACTIVE | COMMUNITY
Start: 2020-12-03

## 2020-12-16 RX ORDER — TRIAMCINOLONE ACETONIDE 1 MG/G
0.1 CREAM TOPICAL
Qty: 45 | Refills: 0 | Status: ACTIVE | COMMUNITY
Start: 2019-09-12

## 2020-12-16 RX ORDER — LEVOFLOXACIN 500 MG/1
500 TABLET, FILM COATED ORAL
Qty: 30 | Refills: 0 | Status: ACTIVE | COMMUNITY
Start: 2020-11-20

## 2020-12-23 PROBLEM — D80.1 ACQUIRED HYPOGAMMAGLOBULINEMIA: Status: ACTIVE | Noted: 2020-12-23

## 2020-12-23 NOTE — HISTORY OF PRESENT ILLNESS
[Disease:__________________________] : Disease: [unfilled] [Home] : at home, [unfilled] , at the time of the visit. [Medical Office: ([facility])___] : at the medical office located in  [Family Member] : family member [Verbal consent obtained from patient] : the patient, [unfilled] [de-identified] : Evelyn Anderson is a 80-year-old woman with newly diagnosed T–AML.  She has a prior history of left breast cancer treated with lumpectomy and radiotherapy in 2015, followed by 5 years of letrozole; she also has a history of CLL diagnosed in 2009 and treated successfully with FCR light in 2009.\par Earlier earlier this year she began to develop cough, exertional dyspnea and fatigue as well as rashes and was tested several times for Covid with negative results.  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  The marrow was 40% cellular.  Iron stores were not seen.  A monosomal karyotype was seen with del 5q., -17, rearrangement involving 1 and 22, +8, +9 and a subclone with +4.  FISH results were concordant with the karyotype.  NGS analysis showed an inhibitory p53 mutation with a VAF of 66% in addition to DNMT3A  and two ASXL1 mutations.  \par She has had no recent fevers and is being maintained on levofloxacin.\par She is being cared for by Dr. Hall who plans to begin treatment with 5-azacitidine and venetoclax. \par She is fully ambulatory and has maintained a overall good performance status\par  \par Her main complaint is fatigue. She feels feverish at times with chills but no documented fevers.  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 [de-identified] : t-AML\par p53 mut\par ASXL1 mut\par DNMT3A mut\par monosomal karyotype [de-identified] : initial visit.

## 2020-12-23 NOTE — REVIEW OF SYSTEMS
[Joint Pain] : joint pain [Joint Stiffness] : joint stiffness [Skin Rash] : skin rash [Negative] : Allergic/Immunologic [Chest Pain] : no chest pain [Palpitations] : no palpitations [Muscle Pain] : no muscle pain [Muscle Weakness] : no muscle weakness [Skin Wound] : no skin wound [FreeTextEntry5] : had abnl stress test, has one artery CAD (Dr. Torres), put on lipitor [FreeTextEntry9] : torn meniscus left knee, left knee Baker cyst [de-identified] : transient rashes [de-identified] : as above

## 2020-12-23 NOTE — ASSESSMENT
[Palliative Care Plan] : not applicable at this time [FreeTextEntry1] : 80-year-old woman with t–AML–developing after chemotherapy with FCR lite for CLL several years ago. Aside from having high risk disease on the basis of it being therapy related, she has severely adverse cytogenetic and molecular genetic findings, including a monosomal karyotype and an inactivating p53 mutation.\par ASXL1 mutations are generally adverse risk while DNMT3a mutations appears to confer increased sensitivity to HMA therapy.\par She has neutropenia with no expectation of recovering her neutrophil count without treatment.\par She is in good condition for her age.\par This is a presentation where there may be similar  benefit with respect to response rates using a hypomethylating agent based regimen as compared with intensive chemotherapy.  This appears to be true irrespective of age.  No proposed treatment with 5-azacytidine and venetoclax is reasonable.  I recommend that she receive 7 days of the HMA accompanied by daily venetoclax and that a bone marrow be checked after 3 weeks.  All other things being equal, a second course should be started in the presence of persistent leukemia.  Clearance of leukemia could lead to the option of giving venetoclax for 21 days each cycle rather than continuously and to early introduction of G-CSF as needed. .  The [facility] group presented useful guidelines for management using this type of regimen (KIP 2020).\par CPX–351 can be considered for treatment of therapy related AML with poor risk cytogenetic features but I don't think that it offers a higher likelihood of response than planned treatment and is surely more toxic.\par We are close to opening a trial for older patients who are not  ideal candidates for treatment with an anthracycline-based regimen using 5-azacitidine and venetoclax with a randomization to receiving the novel ALON-8 inhibitor pevonedistat.  \par Pt's CLL remains in remission.  Consideration could be given to using IVIG which she received in past if significantly hypogamma should she develop infectioous complications.,\par  \par

## 2021-02-27 ENCOUNTER — INPATIENT (INPATIENT)
Facility: HOSPITAL | Age: 81
LOS: 5 days | Discharge: HOME | DRG: 871 | End: 2021-03-05
Attending: INTERNAL MEDICINE | Admitting: INTERNAL MEDICINE
Payer: MEDICARE

## 2021-02-27 ENCOUNTER — RESULT REVIEW (OUTPATIENT)
Age: 81
End: 2021-02-27

## 2021-02-27 VITALS
SYSTOLIC BLOOD PRESSURE: 134 MMHG | DIASTOLIC BLOOD PRESSURE: 78 MMHG | TEMPERATURE: 98 F | WEIGHT: 145.06 LBS | RESPIRATION RATE: 18 BRPM | OXYGEN SATURATION: 96 % | HEIGHT: 66 IN | HEART RATE: 110 BPM

## 2021-02-27 DIAGNOSIS — L03.90 CELLULITIS, UNSPECIFIED: ICD-10-CM

## 2021-02-27 LAB
ALBUMIN SERPL ELPH-MCNC: 3.9 G/DL — SIGNIFICANT CHANGE UP (ref 3.3–5)
ALP SERPL-CCNC: 88 U/L — SIGNIFICANT CHANGE UP (ref 40–120)
ALT FLD-CCNC: 8 U/L — LOW (ref 10–45)
ANION GAP SERPL CALC-SCNC: 16 MMOL/L — SIGNIFICANT CHANGE UP (ref 5–17)
ANISOCYTOSIS BLD QL: SIGNIFICANT CHANGE UP
APTT BLD: 37.5 SEC — HIGH (ref 27.5–35.5)
AST SERPL-CCNC: 24 U/L — SIGNIFICANT CHANGE UP (ref 10–40)
BASE EXCESS BLDV CALC-SCNC: 0.7 MMOL/L — SIGNIFICANT CHANGE UP (ref -2–2)
BASOPHILS # BLD AUTO: 0.03 K/UL — SIGNIFICANT CHANGE UP (ref 0–0.2)
BASOPHILS NFR BLD AUTO: 4 % — HIGH (ref 0–2)
BILIRUB SERPL-MCNC: 0.3 MG/DL — SIGNIFICANT CHANGE UP (ref 0.2–1.2)
BUN SERPL-MCNC: 11 MG/DL — SIGNIFICANT CHANGE UP (ref 7–23)
CA-I SERPL-SCNC: 1.19 MMOL/L — SIGNIFICANT CHANGE UP (ref 1.12–1.3)
CALCIUM SERPL-MCNC: 9.7 MG/DL — SIGNIFICANT CHANGE UP (ref 8.4–10.5)
CHLORIDE BLDV-SCNC: 102 MMOL/L — SIGNIFICANT CHANGE UP (ref 96–108)
CHLORIDE SERPL-SCNC: 98 MMOL/L — SIGNIFICANT CHANGE UP (ref 96–108)
CO2 BLDV-SCNC: 27 MMOL/L — SIGNIFICANT CHANGE UP (ref 22–30)
CO2 SERPL-SCNC: 23 MMOL/L — SIGNIFICANT CHANGE UP (ref 22–31)
CREAT SERPL-MCNC: 0.73 MG/DL — SIGNIFICANT CHANGE UP (ref 0.5–1.3)
DACRYOCYTES BLD QL SMEAR: SLIGHT — SIGNIFICANT CHANGE UP
EOSINOPHIL # BLD AUTO: 0 K/UL — SIGNIFICANT CHANGE UP (ref 0–0.5)
EOSINOPHIL NFR BLD AUTO: 0 % — SIGNIFICANT CHANGE UP (ref 0–6)
GAS PNL BLDV: 135 MMOL/L — SIGNIFICANT CHANGE UP (ref 135–145)
GAS PNL BLDV: SIGNIFICANT CHANGE UP
GAS PNL BLDV: SIGNIFICANT CHANGE UP
GLUCOSE BLDV-MCNC: 106 MG/DL — HIGH (ref 70–99)
GLUCOSE SERPL-MCNC: 112 MG/DL — HIGH (ref 70–99)
HCO3 BLDV-SCNC: 25 MMOL/L — SIGNIFICANT CHANGE UP (ref 21–29)
HCT VFR BLD CALC: 23.2 % — LOW (ref 34.5–45)
HCT VFR BLDA CALC: 22 % — CRITICAL LOW (ref 39–50)
HGB BLD CALC-MCNC: 6.9 G/DL — CRITICAL LOW (ref 11.5–15.5)
HGB BLD-MCNC: 7.3 G/DL — LOW (ref 11.5–15.5)
HYPOCHROMIA BLD QL: SLIGHT — SIGNIFICANT CHANGE UP
INR BLD: 1.17 RATIO — HIGH (ref 0.88–1.16)
LACTATE BLDV-MCNC: 1.7 MMOL/L — SIGNIFICANT CHANGE UP (ref 0.7–2)
LG PLATELETS BLD QL AUTO: SLIGHT — SIGNIFICANT CHANGE UP
LYMPHOCYTES # BLD AUTO: 0.26 K/UL — LOW (ref 1–3.3)
LYMPHOCYTES # BLD AUTO: 32 % — SIGNIFICANT CHANGE UP (ref 13–44)
MACROCYTES BLD QL: SLIGHT — SIGNIFICANT CHANGE UP
MANUAL SMEAR VERIFICATION: SIGNIFICANT CHANGE UP
MCHC RBC-ENTMCNC: 31.5 GM/DL — LOW (ref 32–36)
MCHC RBC-ENTMCNC: 31.5 PG — SIGNIFICANT CHANGE UP (ref 27–34)
MCV RBC AUTO: 100 FL — SIGNIFICANT CHANGE UP (ref 80–100)
MICROCYTES BLD QL: SLIGHT — SIGNIFICANT CHANGE UP
MONOCYTES # BLD AUTO: 0 K/UL — SIGNIFICANT CHANGE UP (ref 0–0.9)
MONOCYTES NFR BLD AUTO: 0 % — LOW (ref 2–14)
NEUTROPHILS # BLD AUTO: 0.48 K/UL — LOW (ref 1.8–7.4)
NEUTROPHILS NFR BLD AUTO: 60 % — SIGNIFICANT CHANGE UP (ref 43–77)
NRBC # BLD: 0 /100 — SIGNIFICANT CHANGE UP (ref 0–0)
OVALOCYTES BLD QL SMEAR: SLIGHT — SIGNIFICANT CHANGE UP
PCO2 BLDV: 45 MMHG — SIGNIFICANT CHANGE UP (ref 35–50)
PH BLDV: 7.37 — SIGNIFICANT CHANGE UP (ref 7.35–7.45)
PLAT MORPH BLD: ABNORMAL
PLATELET # BLD AUTO: 141 K/UL — LOW (ref 150–400)
PO2 BLDV: 34 MMHG — SIGNIFICANT CHANGE UP (ref 25–45)
POIKILOCYTOSIS BLD QL AUTO: SLIGHT — SIGNIFICANT CHANGE UP
POLYCHROMASIA BLD QL SMEAR: SLIGHT — SIGNIFICANT CHANGE UP
POTASSIUM BLDV-SCNC: 4 MMOL/L — SIGNIFICANT CHANGE UP (ref 3.5–5.3)
POTASSIUM SERPL-MCNC: 4.2 MMOL/L — SIGNIFICANT CHANGE UP (ref 3.5–5.3)
POTASSIUM SERPL-SCNC: 4.2 MMOL/L — SIGNIFICANT CHANGE UP (ref 3.5–5.3)
PROT SERPL-MCNC: 7 G/DL — SIGNIFICANT CHANGE UP (ref 6–8.3)
PROTHROM AB SERPL-ACNC: 13.9 SEC — HIGH (ref 10.6–13.6)
RBC # BLD: 2.32 M/UL — LOW (ref 3.8–5.2)
RBC # FLD: 16.2 % — HIGH (ref 10.3–14.5)
RBC BLD AUTO: ABNORMAL
SAO2 % BLDV: 58 % — LOW (ref 67–88)
SODIUM SERPL-SCNC: 137 MMOL/L — SIGNIFICANT CHANGE UP (ref 135–145)
VARIANT LYMPHS # BLD: 4 % — SIGNIFICANT CHANGE UP (ref 0–6)
WBC # BLD: 0.8 K/UL — CRITICAL LOW (ref 3.8–10.5)
WBC # FLD AUTO: 0.8 K/UL — CRITICAL LOW (ref 3.8–10.5)

## 2021-02-27 PROCEDURE — 99223 1ST HOSP IP/OBS HIGH 75: CPT | Mod: 25

## 2021-02-27 PROCEDURE — 88305 TISSUE EXAM BY PATHOLOGIST: CPT | Mod: 26

## 2021-02-27 PROCEDURE — 88342 IMHCHEM/IMCYTCHM 1ST ANTB: CPT | Mod: 26

## 2021-02-27 PROCEDURE — 88312 SPECIAL STAINS GROUP 1: CPT | Mod: 26

## 2021-02-27 PROCEDURE — 11104 PUNCH BX SKIN SINGLE LESION: CPT

## 2021-02-27 PROCEDURE — 73140 X-RAY EXAM OF FINGER(S): CPT | Mod: 26,RT

## 2021-02-27 PROCEDURE — 99285 EMERGENCY DEPT VISIT HI MDM: CPT | Mod: GC

## 2021-02-27 PROCEDURE — 88341 IMHCHEM/IMCYTCHM EA ADD ANTB: CPT | Mod: 26

## 2021-02-27 PROCEDURE — 11105 PUNCH BX SKIN EA SEP/ADDL: CPT

## 2021-02-27 RX ORDER — CEFEPIME 1 G/1
INJECTION, POWDER, FOR SOLUTION INTRAMUSCULAR; INTRAVENOUS
Refills: 0 | Status: DISCONTINUED | OUTPATIENT
Start: 2021-02-27 | End: 2021-03-04

## 2021-02-27 RX ORDER — CEFEPIME 1 G/1
1000 INJECTION, POWDER, FOR SOLUTION INTRAMUSCULAR; INTRAVENOUS EVERY 8 HOURS
Refills: 0 | Status: DISCONTINUED | OUTPATIENT
Start: 2021-02-28 | End: 2021-03-04

## 2021-02-27 RX ORDER — VANCOMYCIN HCL 1 G
1000 VIAL (EA) INTRAVENOUS ONCE
Refills: 0 | Status: COMPLETED | OUTPATIENT
Start: 2021-02-27 | End: 2021-02-27

## 2021-02-27 RX ORDER — CEFEPIME 1 G/1
1000 INJECTION, POWDER, FOR SOLUTION INTRAMUSCULAR; INTRAVENOUS ONCE
Refills: 0 | Status: COMPLETED | OUTPATIENT
Start: 2021-02-27 | End: 2021-02-27

## 2021-02-27 RX ORDER — SODIUM CHLORIDE 9 MG/ML
1000 INJECTION, SOLUTION INTRAVENOUS
Refills: 0 | Status: DISCONTINUED | OUTPATIENT
Start: 2021-02-27 | End: 2021-03-05

## 2021-02-27 RX ORDER — VANCOMYCIN HCL 1 G
1000 VIAL (EA) INTRAVENOUS EVERY 12 HOURS
Refills: 0 | Status: DISCONTINUED | OUTPATIENT
Start: 2021-02-28 | End: 2021-03-04

## 2021-02-27 RX ORDER — OXYCODONE AND ACETAMINOPHEN 5; 325 MG/1; MG/1
1 TABLET ORAL EVERY 6 HOURS
Refills: 0 | Status: DISCONTINUED | OUTPATIENT
Start: 2021-02-27 | End: 2021-03-05

## 2021-02-27 RX ORDER — CEFAZOLIN SODIUM 1 G
1000 VIAL (EA) INJECTION ONCE
Refills: 0 | Status: COMPLETED | OUTPATIENT
Start: 2021-02-27 | End: 2021-02-27

## 2021-02-27 RX ORDER — ACETAMINOPHEN 500 MG
650 TABLET ORAL EVERY 6 HOURS
Refills: 0 | Status: DISCONTINUED | OUTPATIENT
Start: 2021-02-27 | End: 2021-03-05

## 2021-02-27 RX ORDER — TRAMADOL HYDROCHLORIDE 50 MG/1
50 TABLET ORAL EVERY 8 HOURS
Refills: 0 | Status: DISCONTINUED | OUTPATIENT
Start: 2021-02-27 | End: 2021-03-05

## 2021-02-27 RX ORDER — VANCOMYCIN HCL 1 G
VIAL (EA) INTRAVENOUS
Refills: 0 | Status: DISCONTINUED | OUTPATIENT
Start: 2021-02-27 | End: 2021-03-04

## 2021-02-27 RX ORDER — SODIUM CHLORIDE 9 MG/ML
1000 INJECTION, SOLUTION INTRAVENOUS ONCE
Refills: 0 | Status: COMPLETED | OUTPATIENT
Start: 2021-02-27 | End: 2021-02-27

## 2021-02-27 RX ORDER — ACETAMINOPHEN 500 MG
650 TABLET ORAL ONCE
Refills: 0 | Status: COMPLETED | OUTPATIENT
Start: 2021-02-27 | End: 2021-02-27

## 2021-02-27 RX ORDER — PANTOPRAZOLE SODIUM 20 MG/1
40 TABLET, DELAYED RELEASE ORAL
Refills: 0 | Status: DISCONTINUED | OUTPATIENT
Start: 2021-02-27 | End: 2021-03-05

## 2021-02-27 RX ADMIN — Medication 250 MILLIGRAM(S): at 17:50

## 2021-02-27 RX ADMIN — Medication 650 MILLIGRAM(S): at 17:50

## 2021-02-27 RX ADMIN — Medication 100 MILLIGRAM(S): at 20:44

## 2021-02-27 RX ADMIN — CEFEPIME 100 MILLIGRAM(S): 1 INJECTION, POWDER, FOR SOLUTION INTRAMUSCULAR; INTRAVENOUS at 22:04

## 2021-02-27 RX ADMIN — SODIUM CHLORIDE 1000 MILLILITER(S): 9 INJECTION, SOLUTION INTRAVENOUS at 17:50

## 2021-02-27 NOTE — ED PROVIDER NOTE - PHYSICAL EXAMINATION
Gen: NAD, A&Ox4. Non-toxic appearing  HEENT: Normocephalic and atraumatic. EOMI, no nasal discharge, mucous membranes moist, no scleral icterus.  CV: Regular rate and rhythm, +S1/S2, no M/R/G. No significant lower extremity edema. Radial and DP pulses present and symetrical. Capillary refill less than 2 seconds.  Resp: Normal effort and rate. CTAB, no rales, rhonchi, or wheezes.  GI: Abdomen soft non-distended, non tender to palpation. No masses appreciated. + bowel sounds.  MSK: No open wounds and no bruising  Neuro: Following commands, speaking in full sentences, moving extremities spontaneously  Psych: Appropriate mood, cooperative Gen: NAD, A&Ox4. Non-toxic appearing  HEENT: Normocephalic and atraumatic. EOMI, no nasal discharge, mucous membranes moist, no scleral icterus.  CV: Regular rate and rhythm, +S1/S2, no M/R/G. No significant lower extremity edema.   Resp: Normal effort and rate. CTAB, no rales, rhonchi, or wheezes.  GI: Abdomen soft non-distended, non tender to palpation. No masses appreciated. + bowel sounds.  Skin: 1 cm purple raised lesion on distal dorsal 3rd finger, with surrounding erythema, tender to palpation. Similar lesion, nontender on dorsum of L foot. Wart like lesions seen on fingertip of L 3rd finger, and medial aspect of proximal R pointer finger  Neuro: Following commands, speaking in full sentences, moving extremities spontaneously  Psych: Appropriate mood, cooperative

## 2021-02-27 NOTE — ED PROVIDER NOTE - ATTENDING CONTRIBUTION TO CARE
I have reviewed this note, the presenting symptoms, and the Chief Complaint and the History of Present Illness as documented, with the other care provider(s) including resident, ACP, and nurses on the patient care team. I have also reviewed this patient's past medical/surgical history and social/family history as reviewed and listed in this electronic medical record.  I agree with the resident/ACP documentation except where noted otherwise in my personal documentation.  See MDM.  --BMM I have reviewed this note, the presenting symptoms, and the Chief Complaint and the History of Present Illness as documented, with the other care provider(s) including resident, ACP, and nurses on the patient care team. I have also reviewed this patient's past medical/surgical history and social/family history as reviewed and listed in this electronic medical record.  I agree with the resident/ACP/medical student documentation except where noted otherwise in my personal documentation.  See MDM.  --BMM

## 2021-02-27 NOTE — ED ADULT NURSE NOTE - OBJECTIVE STATEMENT
Pt is a 80 yr old female with pmh of cardiac stenosis with no stents, lumpectomy in 2015, CLL in 2009, and AML last Chemo pill on January 30th- sent in by dermatologist  for possible finger infection. Pt noticed around last week that she had some small blisters on her right hand that grew into large painful blisters. Pt went to urgent care who attempted to drain the one on her right middle finger but was only getting blood out and decided it was best if she follow up with her oncologist/dermatologist. Pt noticed now that she has about four sites of blisters all in various stages of growth. The larges one is on her right middle finger- site is purple and red with slight bloody opening from urgent care drain attempt - two other sites on her right hand which are white/red and smaller and the newest one is on the top of her left foot. The left foot is red and blue in color with no drainage. Pt endorses some fever at home around two days ago (ever since the chemo treatments)- pt takes Tyelenol for the fevers which helps. Pt denies chest pain, headache or sick contacts. Pt a/ox 3. Vital signs stable.

## 2021-02-27 NOTE — CONSULT NOTE ADULT - ASSESSMENT
Assessment and Plan:  1) Favor Sweet Syndrome, though given patient's immunocompromised state and current neutropenia, unable to rule out an infectious etiology (bacterial, fungal, atypical mycobacterial) with certainty at this point.   Sweet syndrome, or acute febrile neutrophilic dermatosis, is an inflammatory disorder manifesting as multiple sterile, painful, edematous, erythematous plaques that are usually associated with fever and leukocytosis. The disease is typically skin-limited, although any organ system may also be affected. Other sites that may be affected include the oral mucosa, gastrointestinal tract, musculoskeletal system, lungs, kidneys, heart, and central nervous system (CNS). It may be seen in patients of all ages, but it is most common in women aged 20-60 and individuals with inflammatory bowel disease (IBD) or hematologic malignancies (especially myeloid leukemias and myelodysplastic syndrome). Although the exact etiology is still unclear, abnormal cytokine expression and atypical neutrophil function are thought to contribute to the pathogenesis. A genetic predisposition may also be operable.   - Punch biopsies performed for H&E as well as tissue culture to help distinguish between Sweet Syndrome vs infectious etiology. Will f/u results (Patient had not been covered for certain organisms such as MRSA and Mucor with outpatient regimen)  - F/u HSV pcr   -Start clobetasol 0.05% ointment to AA bid. Anticipate starting PO prednisone once prelim read of biopsy is available. However, if patient should start experiencing significant pain or developing systemic symptoms, may consider starting PO prednisone ahead of biopsy read while maintaining patient on full antimicrobial coverage  - Maintain low threshold for pulmonary imaging should patient develop any respiratory symptoms    Dermatology Punch Biopsy Procedure Note  -After risks and benefits of procedure including bleeding, infection and scar were reviewed (consents including photo consent reviewed, signed and in chart), allergies were reviewed and time out performed.  -Area cleaned with rubbing alcohol and anesthetized with lidocaine and epinephrine.  Two 4mm punch biopsies were performed to left dorsal foot, hemostasis achieved with 4-0 Chromic gut sutures. Dressing with Vaseline. Wound care reviewed with patient and team.  -Sutures are dissolvable, no need for removal. Please leave dressing on for 24-48 hours and after that please apply vaseline on the biopsy site 2-3 times daily to keep area moist and promote healing.    Fatoumata Maya MD  Resident Physician, PGY2  [facility]  Pager: [phone]  Office: [phone]    The patient's chart was reviewed in addition to being seen and examined at bedside. Discussed with dermatology attending Dr. Haque  Recommendations were communicated with the primary team.  Please page [phone] for further related questions. Assessment and Plan:  1) Favor Sweet Syndrome, though given patient's immunocompromised state and current neutropenia, unable to rule out an infectious etiology (bacterial, fungal, atypical mycobacterial) with certainty at this point.   Sweet syndrome, or acute febrile neutrophilic dermatosis, is an inflammatory disorder manifesting as multiple sterile, painful, edematous, erythematous plaques that are usually associated with fever and leukocytosis. The disease is typically skin-limited, although any organ system may also be affected. Other sites that may be affected include the oral mucosa, gastrointestinal tract, musculoskeletal system, lungs, kidneys, heart, and central nervous system (CNS). It may be seen in patients of all ages, but it is most common in women aged 20-60 and individuals with inflammatory bowel disease (IBD) or hematologic malignancies (especially myeloid leukemias and myelodysplastic syndrome). Although the exact etiology is still unclear, abnormal cytokine expression and atypical neutrophil function are thought to contribute to the pathogenesis. A genetic predisposition may also be operable.   - Punch biopsies performed for H&E as well as tissue culture to help distinguish between Sweet Syndrome vs infectious etiology. Will f/u results (Patient had not been covered for certain organisms such as MRSA and Mucor with outpatient regimen)  - F/u HSV pcr   -Start clobetasol 0.05% ointment to AA bid. Anticipate starting PO prednisone once prelim read of biopsy is available. However, if patient should start experiencing significant pain or developing systemic symptoms, may consider starting PO prednisone ahead of biopsy read while maintaining patient on full antimicrobial coverage  - Maintain low threshold for pulmonary imaging should patient develop any respiratory symptoms    Dermatology Punch Biopsy Procedure Note  -After risks and benefits of procedure including bleeding, infection and scar were reviewed (consents including photo consent reviewed, signed and in chart), allergies were reviewed and time out performed.  -Area cleaned with rubbing alcohol and anesthetized with lidocaine and epinephrine.  Two 4mm punch biopsies were performed to left dorsal foot, hemostasis achieved with 4-0 Chromic gut sutures. Dressing with Vaseline. Wound care reviewed with patient and team.  -Sutures are dissolvable, no need for removal. Please leave dressing on for 24-48 hours and after that please apply vaseline on the biopsy site 2-3 times daily to keep area moist and promote healing.    Fatoumata Maya MD  Resident Physician, PGY2  [facility]  Pager: [phone]  Office: [phone]    The patient's chart was reviewed in addition to being seen and examined at bedside. Seen and discussed with dermatology attending Dr. Haque  Recommendations were communicated with the primary team.  Please page [phone] for further related questions.

## 2021-02-27 NOTE — ED PROVIDER NOTE - NS ED ROS FT
GENERAL: No fever or chills  EYES: No change in vision  HEENT: No trouble swallowing or speaking  CARDIAC: No chest pain  PULMONARY: No cough or SOB  GI: No abdominal pain, no nausea or no vomiting, no diarrhea or constipation  : No changes in urination  SKIN: No rashes  NEURO: No headache, no numbness  MSK: see HPI  Otherwise as HPI or negative. GENERAL: Mild fevers, intermittent sujective chills, intermittent night sweats  EYES: No change in vision  HEENT: No trouble swallowing or speaking  CARDIAC: No chest pain  PULMONARY: No cough or acute SOB  GI: No abdominal pain, no nausea or no vomiting, no diarrhea or constipation  : No changes in urination  SKIN: redness around chemo injection sites on upper R arm and abdomen and lesions as described in HPI  NEURO: No headache, no numbness  MSK: see HPI  Otherwise as HPI or negative.

## 2021-02-27 NOTE — H&P ADULT - NSHPPHYSICALEXAM_GEN_ALL_CORE
T(F): 98.9 (02-27-21 @ 19:20), Max: 98.9 (02-27-21 @ 19:20)  HR: 95 (02-27-21 @ 19:20) (95 - 110)  BP: 121/72 (02-27-21 @ 19:20) (121/72 - 144/84)  RR: 18 (02-27-21 @ 19:20) (17 - 18)  SpO2: 100% (02-27-21 @ 19:20) (96% - 100%)    PHYSICAL EXAM:  GENERAL: NAD, well-developed  HEAD:  Atraumatic, Normocephalic  EYES: EOMI, PERRLA, conjunctiva and sclera clear  NECK: Supple, No JVD  CHEST/LUNG: Clear to auscultation bilaterally; No wheeze  HEART: Regular rate and rhythm; No murmurs, rubs, or gallops  ABDOMEN: Soft, Nontender, Nondistended; Bowel sounds present  EXTREMITIES:  2+ Peripheral Pulses, No clubbing, cyanosis, or edema  PSYCH: AAOx3  NEUROLOGY: non-focal  SKIN: No rashes or lesions

## 2021-02-27 NOTE — ED PROVIDER NOTE - CLINICAL SUMMARY MEDICAL DECISION MAKING FREE TEXT BOX
Pt is a 81 yo F w/ AML, CML, hx L breast lumpectomy p/w R 3rd digit skin lesion x 1 week; pt w/ leukopenia, s/p LR bolus, vancomycin, and tylenol PO.  - Will need admission for continued IV abx. Pt is a 79 yo F w/ AML, CML, hx L breast lumpectomy p/w R 3rd digit skin lesion x 1 week; pt w/ leukopenia, s/p LR bolus, vancomycin, and tylenol PO.  - Will need admission for continued IV abx.  --BMM

## 2021-02-27 NOTE — H&P ADULT - HISTORY OF PRESENT ILLNESS
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 Pt reported that she noticed a skin lesion on her right middle finger about 1 week ago, which has worsened over time. Pt described the pain in the lesion as throbbing intermittent, worse when pressure is applied. Pt recently stopped taking levaquin and was switched to cephalexin by her oncologist Dr. Hall. In addition to the cephalexin, she takes acyclovir, and voriconazole. Pt reported that she had worsening pain in the lesion which prompted her to visit [facility], where it was unable to be drained. Her doctor suggested she come to [facility] for dermatologic expertise. In addition to the lesion in her R 3rd digit, she has noticed the starting of new similar lesions on her b/l hands and L foot. Pt says she had a temp of 100.3 and 99.9F earlier in the week, and has intermittent night sweats over the last two weeks.

## 2021-02-27 NOTE — ED PROVIDER NOTE - CARE PLAN
Principal Discharge DX:	Cellulitis  Secondary Diagnosis:	Neutropenia   Principal Discharge DX:	Cellulitis  Goal:	3rd distal phalanx (finger)  Secondary Diagnosis:	Neutropenia

## 2021-02-27 NOTE — ED PROVIDER NOTE - OBJECTIVE STATEMENT
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 Pt reported that she noticed a skin lesion on her right middle finger about 1 week ago. Pt reported visit urgent care center where she was prescribed cephalexin, acyclovir, and voriconazole. Pt reported that she had worsening pain which prompted her to visit [facility]. Pt descrbied the pain as throbbing intermittent.     Onc: Dr. Diop Pt is a 79 yo F w/ AML, CML, hx L breast lumpectomy p/w R 3rd digit skin lesion x 1 week. Pt reported that she noticed a skin lesion on her right middle finger about 1 week ago, which has worsened over time. Pt described the pain in the lesion as throbbing intermittent, worse when pressure is applied. Pt recently stopped taking levaquin and was switched to cephalexin by her oncologist Dr. Hall. In addition to the cephalexin, she takes acyclovir, and voriconazole. Pt reported that she had worsening pain in the lesion which prompted her to visit [facility], where it was unable to be drained. Her doctor suggested she come to [facility] for dermatologic expertise. In addition to the lesion in her R 3rd digit, she has noticed the starting of new similar lesions on her b/l hands and L foot. Pt says she had a temp of 100.3 and 99.9F earlier in the week, and has intermittent night sweats over the last two weeks.

## 2021-02-27 NOTE — CONSULT NOTE ADULT - SUBJECTIVE AND OBJECTIVE BOX
HPI    Pt is a 79 yo F w/ AML (just finished venetoclax and azacitidine), history of CML, hx L breast lumpectomy p/w R 3rd digit skin lesion x 1 week. Pt reported that she noticed a skin lesion on her right middle finger about 1 week ago, which has worsened over time. Pt described the pain in the lesion as throbbing intermittent, worse when pressure is applied. In addition to the lesion on her right third digit, she has noticed the starting of new similar lesions on her b/l hands and L foot. Pt says she had a temp of 100.3 and 99.9F earlier in the week, and has intermittent night sweats over the last two weeks. Patient also notes that about two weeks prior she developed a tender ulcer on her tongue which now appears to have healed. Of note, patient has been on levaquin since November for prophylaxis given her leukopenia. Earlier in February, she was started on acyclovir and voriconazole. Two days prior to admission patient was switched from Levaquin to Cephalexin as the lesion on her finger was continuing to enlarge and was increasingly more tender. Pt visited [facility], where the lesion was unable to be drained. She then saw an outside dermatologist who recommended eval in hospital with derm consult service. Dermatology consulted for these lesions. Patient denies current fevers, endorses swelling of third digit of right hand, denies sob.       PAST MEDICAL & SURGICAL HISTORY:  AML  CML  L breast lumpectomy      REVIEW OF SYSTEMS      General: no fevers/chills, no lethargy	    Skint: see HPI  	  ENMT: no dysphagia    Respiratory and Thorax: no SOB or cough  	  Cardiovascular: no palpitations or chest pain    Gastrointestinal: no abdominal pain    Musculoskeletal: no joint pains or weakness	    Neurological: no weakness, numbness     MEDICATIONS  (STANDING):  cefepime   IVPB      clobetasol 0.05% Cream 1 Application(s) Topical once  sodium chloride 0.45%. 1000 milliLiter(s) (60 mL/Hr) IV Continuous <Continuous>  vancomycin  IVPB        MEDICATIONS  (PRN):      Allergies    Bactrim (Rash)  dapsone (Other)  doxycycline (Rash)  sulfa drugs (Hives; Rash)    Intolerances      FAMILY HISTORY:  Non-contributory       Vital Signs Last 24 Hrs  T(C): 37.1 (27 Feb 2021 22:02), Max: 37.2 (27 Feb 2021 19:20)  T(F): 98.8 (27 Feb 2021 22:02), Max: 98.9 (27 Feb 2021 19:20)  HR: 84 (27 Feb 2021 22:02) (84 - 110)  BP: 113/60 (27 Feb 2021 22:02) (113/60 - 144/84)  BP(mean): 70 (27 Feb 2021 22:02) (70 - 102)  RR: 18 (27 Feb 2021 22:02) (17 - 18)  SpO2: 100% (27 Feb 2021 22:02) (96% - 100%)    PHYSICAL EXAM:     The patient was alert and oriented X 3, well nourished, and in no  apparent distress.  OP showed healing erosion on left lateral wall of tongue  There was no visible lymphadenopathy.  Conjunctiva were non injected  There was no clubbing or edema of extremities.  The scalp, hair, face, eyebrows, lips, OP, neck, chest, back,   extremities X 4, nails were examined.  There was no hyperhidrosis or bromhidrosis.    Of note on skin exam:   Right distal third digit with gray-purple ulcerated tender nodule on erythematous base  Left dorsal foot with gray-purple nodule on erythematous base  Left index finger with pink papule on lateral side  Skin colored papule on tip of left third middle finger    LABS:                        7.3    0.80  )-----------( 141      ( 27 Feb 2021 17:53 )             23.2     02-27    137  |  98  |  11  ----------------------------<  112<H>  4.2   |  23  |  0.73    Ca    9.7      27 Feb 2021 17:53    TPro  7.0  /  Alb  3.9  /  TBili  0.3  /  DBili  x   /  AST  24  /  ALT  8<L>  /  AlkPhos  88  02-27    PT/INR - ( 27 Feb 2021 17:53 )   PT: 13.9 sec;   INR: 1.17 ratio         PTT - ( 27 Feb 2021 17:53 )  PTT:37.5 sec      RADIOLOGY & ADDITIONAL STUDIES:

## 2021-02-28 LAB
ANION GAP SERPL CALC-SCNC: 10 MMOL/L — SIGNIFICANT CHANGE UP (ref 5–17)
APPEARANCE UR: CLEAR — SIGNIFICANT CHANGE UP
BILIRUB UR-MCNC: NEGATIVE — SIGNIFICANT CHANGE UP
BUN SERPL-MCNC: 8 MG/DL — SIGNIFICANT CHANGE UP (ref 7–23)
CALCIUM SERPL-MCNC: 9 MG/DL — SIGNIFICANT CHANGE UP (ref 8.4–10.5)
CALCOFLUOR WHITE SPEC: SIGNIFICANT CHANGE UP
CHLORIDE SERPL-SCNC: 101 MMOL/L — SIGNIFICANT CHANGE UP (ref 96–108)
CO2 SERPL-SCNC: 26 MMOL/L — SIGNIFICANT CHANGE UP (ref 22–31)
COLOR SPEC: COLORLESS — SIGNIFICANT CHANGE UP
CREAT SERPL-MCNC: 0.64 MG/DL — SIGNIFICANT CHANGE UP (ref 0.5–1.3)
DIFF PNL FLD: NEGATIVE — SIGNIFICANT CHANGE UP
GLUCOSE SERPL-MCNC: 102 MG/DL — HIGH (ref 70–99)
GLUCOSE UR QL: NEGATIVE — SIGNIFICANT CHANGE UP
GRAM STN FLD: SIGNIFICANT CHANGE UP
HCT VFR BLD CALC: 22.3 % — LOW (ref 34.5–45)
HERPES SIMPLEX VIRUS 1/2 SURVEILLANCE PCR RESULT: SIGNIFICANT CHANGE UP
HERPES SIMPLEX VIRUS 1/2 SURVEILLANCE PCR SOURCE: SIGNIFICANT CHANGE UP
HGB BLD-MCNC: 7 G/DL — CRITICAL LOW (ref 11.5–15.5)
HSV1+2 DNA SPEC QL NAA+PROBE: SIGNIFICANT CHANGE UP
KETONES UR-MCNC: NEGATIVE — SIGNIFICANT CHANGE UP
LEUKOCYTE ESTERASE UR-ACNC: NEGATIVE — SIGNIFICANT CHANGE UP
MCHC RBC-ENTMCNC: 31.4 GM/DL — LOW (ref 32–36)
MCHC RBC-ENTMCNC: 31.5 PG — SIGNIFICANT CHANGE UP (ref 27–34)
MCV RBC AUTO: 100.5 FL — HIGH (ref 80–100)
NIGHT BLUE STAIN TISS: SIGNIFICANT CHANGE UP
NITRITE UR-MCNC: NEGATIVE — SIGNIFICANT CHANGE UP
NRBC # BLD: 0 /100 WBCS — SIGNIFICANT CHANGE UP (ref 0–0)
PH UR: 7 — SIGNIFICANT CHANGE UP (ref 5–8)
PLATELET # BLD AUTO: 147 K/UL — LOW (ref 150–400)
POTASSIUM SERPL-MCNC: 4.2 MMOL/L — SIGNIFICANT CHANGE UP (ref 3.5–5.3)
POTASSIUM SERPL-SCNC: 4.2 MMOL/L — SIGNIFICANT CHANGE UP (ref 3.5–5.3)
PROT UR-MCNC: NEGATIVE — SIGNIFICANT CHANGE UP
RBC # BLD: 2.22 M/UL — LOW (ref 3.8–5.2)
RBC # FLD: 16 % — HIGH (ref 10.3–14.5)
SARS-COV-2 RNA SPEC QL NAA+PROBE: SIGNIFICANT CHANGE UP
SODIUM SERPL-SCNC: 137 MMOL/L — SIGNIFICANT CHANGE UP (ref 135–145)
SP GR SPEC: 1.01 — LOW (ref 1.01–1.02)
SPECIMEN SOURCE: SIGNIFICANT CHANGE UP
UROBILINOGEN FLD QL: NEGATIVE — SIGNIFICANT CHANGE UP
WBC # BLD: 0.86 K/UL — CRITICAL LOW (ref 3.8–10.5)
WBC # FLD AUTO: 0.86 K/UL — CRITICAL LOW (ref 3.8–10.5)

## 2021-02-28 PROCEDURE — 99221 1ST HOSP IP/OBS SF/LOW 40: CPT

## 2021-02-28 RX ORDER — SENNA PLUS 8.6 MG/1
2 TABLET ORAL AT BEDTIME
Refills: 0 | Status: DISCONTINUED | OUTPATIENT
Start: 2021-02-28 | End: 2021-03-05

## 2021-02-28 RX ORDER — POLYETHYLENE GLYCOL 3350 17 G/17G
17 POWDER, FOR SOLUTION ORAL DAILY
Refills: 0 | Status: DISCONTINUED | OUTPATIENT
Start: 2021-02-28 | End: 2021-03-02

## 2021-02-28 RX ORDER — ACYCLOVIR SODIUM 500 MG
400 VIAL (EA) INTRAVENOUS
Refills: 0 | Status: DISCONTINUED | OUTPATIENT
Start: 2021-02-28 | End: 2021-03-05

## 2021-02-28 RX ORDER — ALPRAZOLAM 0.25 MG
0.5 TABLET ORAL ONCE
Refills: 0 | Status: DISCONTINUED | OUTPATIENT
Start: 2021-02-28 | End: 2021-02-28

## 2021-02-28 RX ORDER — POSACONAZOLE 100 MG/1
300 TABLET, DELAYED RELEASE ORAL DAILY
Refills: 0 | Status: DISCONTINUED | OUTPATIENT
Start: 2021-02-28 | End: 2021-03-05

## 2021-02-28 RX ADMIN — SENNA PLUS 2 TABLET(S): 8.6 TABLET ORAL at 21:57

## 2021-02-28 RX ADMIN — CEFEPIME 100 MILLIGRAM(S): 1 INJECTION, POWDER, FOR SOLUTION INTRAMUSCULAR; INTRAVENOUS at 21:58

## 2021-02-28 RX ADMIN — OXYCODONE AND ACETAMINOPHEN 1 TABLET(S): 5; 325 TABLET ORAL at 21:57

## 2021-02-28 RX ADMIN — SODIUM CHLORIDE 60 MILLILITER(S): 9 INJECTION, SOLUTION INTRAVENOUS at 09:58

## 2021-02-28 RX ADMIN — SODIUM CHLORIDE 60 MILLILITER(S): 9 INJECTION, SOLUTION INTRAVENOUS at 21:58

## 2021-02-28 RX ADMIN — Medication 400 MILLIGRAM(S): at 18:20

## 2021-02-28 RX ADMIN — CEFEPIME 100 MILLIGRAM(S): 1 INJECTION, POWDER, FOR SOLUTION INTRAMUSCULAR; INTRAVENOUS at 06:06

## 2021-02-28 RX ADMIN — PANTOPRAZOLE SODIUM 40 MILLIGRAM(S): 20 TABLET, DELAYED RELEASE ORAL at 06:07

## 2021-02-28 RX ADMIN — SODIUM CHLORIDE 60 MILLILITER(S): 9 INJECTION, SOLUTION INTRAVENOUS at 17:31

## 2021-02-28 RX ADMIN — Medication 1 APPLICATION(S): at 13:32

## 2021-02-28 RX ADMIN — CEFEPIME 100 MILLIGRAM(S): 1 INJECTION, POWDER, FOR SOLUTION INTRAMUSCULAR; INTRAVENOUS at 13:32

## 2021-02-28 RX ADMIN — OXYCODONE AND ACETAMINOPHEN 1 TABLET(S): 5; 325 TABLET ORAL at 00:30

## 2021-02-28 RX ADMIN — POSACONAZOLE 300 MILLIGRAM(S): 100 TABLET, DELAYED RELEASE ORAL at 18:21

## 2021-02-28 RX ADMIN — OXYCODONE AND ACETAMINOPHEN 1 TABLET(S): 5; 325 TABLET ORAL at 16:04

## 2021-02-28 RX ADMIN — Medication 0.5 MILLIGRAM(S): at 21:57

## 2021-02-28 RX ADMIN — POLYETHYLENE GLYCOL 3350 17 GRAM(S): 17 POWDER, FOR SOLUTION ORAL at 13:38

## 2021-02-28 RX ADMIN — Medication 0.5 MILLIGRAM(S): at 00:38

## 2021-02-28 RX ADMIN — Medication 250 MILLIGRAM(S): at 17:31

## 2021-02-28 RX ADMIN — Medication 250 MILLIGRAM(S): at 06:07

## 2021-02-28 NOTE — CONSULT NOTE ADULT - ASSESSMENT
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 Pt reported that she noticed a skin lesion on her right middle finger about 1 week ago, which has worsened over time. Pt described the pain in the lesion as throbbing intermittent, worse when pressure is applied. Pt recently stopped taking levaquin and was switched to cephalexin by her oncologist Dr. Hall. In addition to the cephalexin, she takes acyclovir, and voriconazole. Pt reported that she had worsening pain in the lesion which prompted her to visit [facility], where it was unable to be drained. Her doctor suggested she come to [facility] for dermatologic expertise. In addition to the lesion in her R 3rd digit, she has noticed the starting of new similar lesions on her b/l hands and L foot. Pt says she had a temp of 100.3 and 99.9F earlier in the week, and has intermittent night sweats over the last two weeks. (27 Feb 2021 21:31)    Pt was also treated for thrush recently, states while on chemotherapy WBC dropped to 0.4.  Developed a left tongue lateral lesion which has now improved.     ER vitals:  T 97.8, P 110, /78.  WBC 0.86.  Rt hand xray no cortical erosions, (+) soft tissue prominence of PIP/DIP.  Pt seen by Derm, noted to have right distal third digit with gray-purple ulcerated tender nodule on erythematous base, left dorsal foot with gray-purple nodule on erythematous base, left index finger with pink papule on lateral side and skin colored papule on tip of left third middle finger.   Pt s/p punch biopsies performed for H&E as well as tissue culture to help distinguish between Sweet Syndrome vs infectious etiology.  Pt started on vanco/cefepime.       Neutropenic fever:    - Pt with low grade fevers at home, (+) leukopenia.  Pt on chemotherapy for AML.  Cont vanco/cefepime for now (cover for MRSA and pseudomonas).  Pt with several skin lesions, s/p recent attempted I&D of rt hand middle finger at [facility]    - Check blood cx x 2    - Monitor Cbc with diff.    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  Send ESR, CRP    - r/o infectious causes such as fungal, mycobacterial, bacterial sepsis.  f/u blood cx.    - Check HIV, EBV/CMV, RPR, parvovirus, erhrlichia/babesia/Lyme, Ricketsia.  Pt denies recent travel, bug or tick bites, sick contacts.     Will follow,    No Jackson  [phone] Pt is a 81 yo F w/ AML, CML, hx L breast lumpectomy p/w R 3rd digit skin lesion x 1 week. Pt reported that she noticed a skin lesion on her right middle finger about 1 week ago, which has worsened over time. Pt described the pain in the lesion as throbbing intermittent, worse when pressure is applied. Pt recently stopped taking levaquin and was switched to cephalexin by her oncologist Dr. Hall. In addition to the cephalexin, she takes acyclovir, and voriconazole. Pt reported that she had worsening pain in the lesion which prompted her to visit [facility], where it was unable to be drained. Her doctor suggested she come to [facility] for dermatologic expertise. In addition to the lesion in her R 3rd digit, she has noticed the starting of new similar lesions on her b/l hands and L foot. Pt says she had a temp of 100.3 and 99.9F earlier in the week, and has intermittent night sweats over the last two weeks. (27 Feb 2021 21:31)    Pt was also treated for thrush recently, states while on chemotherapy WBC dropped to 0.4.  Developed a left tongue lateral lesion which has now improved.     ER vitals:  T 97.8, P 110, /78.  WBC 0.86.  Rt hand xray no cortical erosions, (+) soft tissue prominence of PIP/DIP.  Pt seen by Derm, noted to have right distal third digit with gray-purple ulcerated tender nodule on erythematous base, left dorsal foot with gray-purple nodule on erythematous base, left index finger with pink papule on lateral side and skin colored papule on tip of left third middle finger.   Pt s/p punch biopsies performed for H&E as well as tissue culture to help distinguish between Sweet Syndrome vs infectious etiology.  Pt started on vanco/cefepime.       Neutropenic fever:    - Pt with low grade fevers at home, (+) leukopenia.  Pt on chemotherapy for AML.  Cont vanco/cefepime for now (cover for MRSA and pseudomonas).  Pt with several skin lesions, s/p recent attempted I&D of rt hand middle finger at [facility]    - Check blood cx x 2    - Monitor Cbc with diff.    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  Send ESR, CRP    - r/o infectious causes such as fungal, mycobacterial, bacterial sepsis.  f/u blood cx.    - Check HIV, EBV/CMV, RPR, parvovirus, erhrlichia/babesia/Lyme.  Pt denies recent travel, bug or tick bites, sick contacts.     Will follow,    No Jackson  [phone]

## 2021-02-28 NOTE — CONSULT NOTE ADULT - SUBJECTIVE AND OBJECTIVE BOX
HPI:  Pt is a 79 yo F w/ AML, CML, hx L breast lumpectomy p/w R 3rd digit skin lesion x 1 week. Pt reported that she noticed a skin lesion on her right middle finger about 1 week ago, which has worsened over time. Pt described the pain in the lesion as throbbing intermittent, worse when pressure is applied. Pt recently stopped taking levaquin and was switched to cephalexin by her oncologist Dr. Hall. In addition to the cephalexin, she takes acyclovir, and voriconazole. Pt reported that she had worsening pain in the lesion which prompted her to visit [facility], where it was unable to be drained. Her doctor suggested she come to [facility] for dermatologic expertise. In addition to the lesion in her R 3rd digit, she has noticed the starting of new similar lesions on her b/l hands and L foot. Pt says she had a temp of 100.3 and 99.9F earlier in the week, and has intermittent night sweats over the last two weeks. (27 Feb 2021 21:31)      PAST MEDICAL & SURGICAL HISTORY:      Allergies    Bactrim (Rash)  dapsone (Other)  doxycycline (Rash)  sulfa drugs (Hives; Rash)    Intolerances        MEDICATIONS  (STANDING):  cefepime   IVPB      cefepime   IVPB 1000 milliGRAM(s) IV Intermittent every 8 hours  pantoprazole    Tablet 40 milliGRAM(s) Oral before breakfast  polyethylene glycol 3350 17 Gram(s) Oral daily  senna 2 Tablet(s) Oral at bedtime  sodium chloride 0.45%. 1000 milliLiter(s) (60 mL/Hr) IV Continuous <Continuous>  vancomycin  IVPB      vancomycin  IVPB 1000 milliGRAM(s) IV Intermittent every 12 hours    MEDICATIONS  (PRN):  acetaminophen   Tablet .. 650 milliGRAM(s) Oral every 6 hours PRN Temp greater or equal to 38C (100.4F), Mild Pain (1 - 3)  oxycodone    5 mG/acetaminophen 325 mG 1 Tablet(s) Oral every 6 hours PRN Severe Pain (7 - 10)  traMADol 50 milliGRAM(s) Oral every 8 hours PRN Moderate Pain (4 - 6)      FAMILY HISTORY:      SOCIAL HISTORY: No EtOH, no tobacco    REVIEW OF SYSTEMS:    CONSTITUTIONAL: No weakness, fevers or chills  EYES/ENT: No visual changes;  No vertigo or throat pain   NECK: No pain or stiffness  RESPIRATORY: No cough, wheezing, hemoptysis; No shortness of breath  CARDIOVASCULAR: No chest pain or palpitations  GASTROINTESTINAL: No abdominal or epigastric pain. No nausea, vomiting, or hematemesis; No diarrhea or constipation. No melena or hematochezia.  GENITOURINARY: No dysuria, frequency or hematuria  NEUROLOGICAL: No numbness or weakness  SKIN: No itching, burning, rashes, or lesions   All other review of systems is negative unless indicated above.    Height (cm): 165.1 (02-28 @ 05:45)  Weight (kg): 65.8 (02-28 @ 05:45)  BMI (kg/m2): 24.1 (02-28 @ 05:45)  BSA (m2): 1.73 (02-28 @ 05:45)    T(F): 98 (02-28-21 @ 08:33), Max: 98.9 (02-27-21 @ 19:20)  HR: 64 (02-28-21 @ 08:33)  BP: 145/78 (02-28-21 @ 08:33)  RR: 18 (02-28-21 @ 08:33)  SpO2: 99% (02-28-21 @ 08:33)  Wt(kg): --    GENERAL: NAD, well-developed  HEAD:  Atraumatic, Normocephalic  EYES: EOMI, PERRLA, conjunctiva and sclera clear  NECK: Supple, No JVD  CHEST/LUNG: Clear to auscultation bilaterally; No wheeze  HEART: Regular rate and rhythm; No murmurs, rubs, or gallops  ABDOMEN: Soft, Nontender, Nondistended; Bowel sounds present  EXTREMITIES:  2+ Peripheral Pulses, No clubbing, cyanosis, or edema  NEUROLOGY: non-focal  SKIN: No rashes or lesions                          7.0    0.86  )-----------( 147      ( 28 Feb 2021 06:20 )             22.3       02-28    137  |  101  |  8   ----------------------------<  102<H>  4.2   |  26  |  0.64    Ca    9.0      28 Feb 2021 06:19    TPro  7.0  /  Alb  3.9  /  TBili  0.3  /  DBili  x   /  AST  24  /  ALT  8<L>  /  AlkPhos  88  02-27          PT/INR - ( 27 Feb 2021 17:53 )   PT: 13.9 sec;   INR: 1.17 ratio         PTT - ( 27 Feb 2021 17:53 )  PTT:37.5 sec    .Tissue Other  02-28 @ 04:39 --  --    No polymorphonuclear leukocytes seen per low power field  No organisms seen per oil power field       HPI:  Pt is a 79 yo F w/ AML, CLL, hx L breast lumpectomy p/w R 3rd digit skin lesion x 1 week. Pt reported that she noticed a skin lesion on her right middle finger about 1 week ago, which has worsened over time. Pt described the pain in the lesion as throbbing intermittent, worse when pressure is applied. Pt recently stopped taking levaquin and was switched to cephalexin by her oncologist Dr. Hall. In addition to the cephalexin, she takes acyclovir, and voriconazole. Pt reported that she had worsening pain in the lesion which prompted her to visit [facility], where it was unable to be drained. Her doctor suggested she come to [facility] for dermatologic expertise. In addition to the lesion in her R 3rd digit, she has noticed the starting of new similar lesions on her b/l hands and L foot. Pt says she had a temp of 100.3 and 99.9F earlier in the week, and has intermittent night sweats over the last two weeks. (27 Feb 2021 21:31)          PAST MEDICAL & SURGICAL HISTORY:      Allergies    Bactrim (Rash)  dapsone (Other)  doxycycline (Rash)  sulfa drugs (Hives; Rash)    Intolerances        MEDICATIONS  (STANDING):  cefepime   IVPB      cefepime   IVPB 1000 milliGRAM(s) IV Intermittent every 8 hours  pantoprazole    Tablet 40 milliGRAM(s) Oral before breakfast  polyethylene glycol 3350 17 Gram(s) Oral daily  senna 2 Tablet(s) Oral at bedtime  sodium chloride 0.45%. 1000 milliLiter(s) (60 mL/Hr) IV Continuous <Continuous>  vancomycin  IVPB      vancomycin  IVPB 1000 milliGRAM(s) IV Intermittent every 12 hours    MEDICATIONS  (PRN):  acetaminophen   Tablet .. 650 milliGRAM(s) Oral every 6 hours PRN Temp greater or equal to 38C (100.4F), Mild Pain (1 - 3)  oxycodone    5 mG/acetaminophen 325 mG 1 Tablet(s) Oral every 6 hours PRN Severe Pain (7 - 10)  traMADol 50 milliGRAM(s) Oral every 8 hours PRN Moderate Pain (4 - 6)      FAMILY HISTORY:      SOCIAL HISTORY: No EtOH, no tobacco    REVIEW OF SYSTEMS:    CONSTITUTIONAL: No weakness, fevers or chills  EYES/ENT: No visual changes;  No vertigo or throat pain   NECK: No pain or stiffness  RESPIRATORY: No cough, wheezing, hemoptysis; No shortness of breath  CARDIOVASCULAR: No chest pain or palpitations  GASTROINTESTINAL: No abdominal or epigastric pain. No nausea, vomiting, or hematemesis; No diarrhea or constipation. No melena or hematochezia.  GENITOURINARY: No dysuria, frequency or hematuria  NEUROLOGICAL: No numbness or weakness  SKIN: No itching, burning, rashes, or lesions   All other review of systems is negative unless indicated above.    Height (cm): 165.1 (02-28 @ 05:45)  Weight (kg): 65.8 (02-28 @ 05:45)  BMI (kg/m2): 24.1 (02-28 @ 05:45)  BSA (m2): 1.73 (02-28 @ 05:45)    T(F): 98 (02-28-21 @ 08:33), Max: 98.9 (02-27-21 @ 19:20)  HR: 64 (02-28-21 @ 08:33)  BP: 145/78 (02-28-21 @ 08:33)  RR: 18 (02-28-21 @ 08:33)  SpO2: 99% (02-28-21 @ 08:33)  Wt(kg): --    GENERAL: NAD, well-developed  HEAD:  Atraumatic, Normocephalic  EYES: EOMI, PERRLA, conjunctiva and sclera clear  NECK: Supple, No JVD  CHEST/LUNG: Clear to auscultation bilaterally; No wheeze  HEART: Regular rate and rhythm; No murmurs, rubs, or gallops  ABDOMEN: Soft, Nontender, Nondistended; Bowel sounds present  EXTREMITIES:  2+ Peripheral Pulses, No clubbing, cyanosis, or edema  NEUROLOGY: non-focal  SKIN: No rashes or lesions                          7.0    0.86  )-----------( 147      ( 28 Feb 2021 06:20 )             22.3       02-28    137  |  101  |  8   ----------------------------<  102<H>  4.2   |  26  |  0.64    Ca    9.0      28 Feb 2021 06:19    TPro  7.0  /  Alb  3.9  /  TBili  0.3  /  DBili  x   /  AST  24  /  ALT  8<L>  /  AlkPhos  88  02-27          PT/INR - ( 27 Feb 2021 17:53 )   PT: 13.9 sec;   INR: 1.17 ratio         PTT - ( 27 Feb 2021 17:53 )  PTT:37.5 sec    .Tissue Other  02-28 @ 04:39 --  --    No polymorphonuclear leukocytes seen per low power field  No organisms seen per oil power field

## 2021-02-28 NOTE — PROGRESS NOTE ADULT - SUBJECTIVE AND OBJECTIVE BOX
Patient is a 80y old  Female who presents with a chief complaint of neutropenic sepsis (2021 14:46)      SUBJECTIVE / OVERNIGHT EVENTS: clinically ptn looks nontoxic, states her: blister" lesions keep spreading and are hurting, biopsy done bu derm  results pending high suspicion for Sweet Syndrome. initiation of steroids as per Derm/Heme    MEDICATIONS  (STANDING):  acyclovir   Oral Tab/Cap 400 milliGRAM(s) Oral two times a day  cefepime   IVPB      cefepime   IVPB 1000 milliGRAM(s) IV Intermittent every 8 hours  pantoprazole    Tablet 40 milliGRAM(s) Oral before breakfast  polyethylene glycol 3350 17 Gram(s) Oral daily  posaconazole DR Tablet 300 milliGRAM(s) Oral daily  senna 2 Tablet(s) Oral at bedtime  sodium chloride 0.45%. 1000 milliLiter(s) (60 mL/Hr) IV Continuous <Continuous>  vancomycin  IVPB      vancomycin  IVPB 1000 milliGRAM(s) IV Intermittent every 12 hours    MEDICATIONS  (PRN):  acetaminophen   Tablet .. 650 milliGRAM(s) Oral every 6 hours PRN Temp greater or equal to 38C (100.4F), Mild Pain (1 - 3)  oxycodone    5 mG/acetaminophen 325 mG 1 Tablet(s) Oral every 6 hours PRN Severe Pain (7 - 10)  traMADol 50 milliGRAM(s) Oral every 8 hours PRN Moderate Pain (4 - 6)      Vital Signs Last 24 Hrs  T(F): 98.3 (21 @ 20:29), Max: 98.7 (21 @ 17:27)  HR: 89 (21 @ 20:29) (64 - 95)  BP: 111/67 (21 @ 20:29) (107/62 - 145/78)  RR: 18 (21 @ 20:29) (16 - 18)  SpO2: 95% (21 @ 20:29) (93% - 99%)  Telemetry:   CAPILLARY BLOOD GLUCOSE        I&O's Summary    2021 07:01  -  2021 07:00  --------------------------------------------------------  IN: 300 mL / OUT: 0 mL / NET: 300 mL    2021 07:01  -  2021 22:02  --------------------------------------------------------  IN: 950 mL / OUT: 1400 mL / NET: -450 mL        PHYSICAL EXAM:  GENERAL: NAD, well-developed  HEAD:  Atraumatic, Normocephalic  EYES: EOMI, PERRLA, conjunctiva and sclera clear  NECK: Supple, No JVD  CHEST/LUNG: Clear to auscultation bilaterally; No wheeze  HEART: Regular rate and rhythm; No murmurs, rubs, or gallops  ABDOMEN: Soft, Nontender, Nondistended; Bowel sounds present  EXTREMITIES:  2+ Peripheral Pulses, No clubbing, cyanosis, or edema  PSYCH: AAOx3  NEUROLOGY: non-focal  SKIN: No rashes or lesions    LABS:                        7.0    0.86  )-----------( 147      ( 2021 06:20 )             22.3         137  |  101  |  8   ----------------------------<  102<H>  4.2   |  26  |  0.64    Ca    9.0      2021 06:19    TPro  7.0  /  Alb  3.9  /  TBili  0.3  /  DBili  x   /  AST  24  /  ALT  8<L>  /  AlkPhos  88      PT/INR - ( 2021 17:53 )   PT: 13.9 sec;   INR: 1.17 ratio         PTT - ( 2021 17:53 )  PTT:37.5 sec      Urinalysis Basic - ( 2021 16:04 )    Color: Colorless / Appearance: Clear / S.008 / pH: x  Gluc: x / Ketone: Negative  / Bili: Negative / Urobili: Negative   Blood: x / Protein: Negative / Nitrite: Negative   Leuk Esterase: Negative / RBC: x / WBC x   Sq Epi: x / Non Sq Epi: x / Bacteria: x        RADIOLOGY & ADDITIONAL TESTS:    Imaging Personally Reviewed:    Consultant(s) Notes Reviewed:      Care Discussed with Consultants/Other Providers:

## 2021-02-28 NOTE — CONSULT NOTE ADULT - ASSESSMENT
81 yo F w/ AML, CLL, hx L breast lumpectomy p/w R 3rd digit skin lesion x 1 week. Pt reported that she noticed a skin lesion on her right middle finger about 1 week ago, which has worsened over time. Pt described the pain in the lesion as throbbing intermittent, worse when pressure is applied.     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 S/p left foot lesion biopsy  -f/u biopsy results   -c/w broad spectrum antibiotics   -pancytopenia 2/2 chemotherapy   -ANC <500, likely lower today  -would continue ppx medications, acyclovir, voriconazole   -f/u cultures   -f/u ID and Derm recs   -CBC with diff daily   -hold on GCSF for now as patient not overtly septic and appears to be improving   -Follow up with Dr. King ([facility]) after discharge: primary team discussed with [facility] covering physician who recommended in house hematology to follow       #CLL   -dx 2009 s/p FCR, in remission   -received IVIG infusions monthly for 6 years, stopped when diagnosed with AML   -check quantitative immunoglobulins, if low will consider giving dose of IVIG     Daniel Su MD   Hematology/Oncology Fellow   pager [phone]   After 5pm and on weekends page on call fellow  81 yo F w/ AML, CLL, hx L breast lumpectomy p/w R 3rd digit skin lesion x 1 week. Pt reported that she noticed a skin lesion on her right middle finger about 1 week ago, which has worsened over time. Pt described the pain in the lesion as throbbing intermittent, worse when pressure is applied.     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 S/p left foot lesion biopsy  -f/u biopsy results   -c/w broad spectrum antibiotics   -pancytopenia 2/2 chemotherapy   -ANC <500, likely lower today  -would continue ppx medications, acyclovir, voriconazole   -f/u cultures   -f/u ID and Derm recs   -CBC with diff daily   -hold on GCSF for now as patient not overtly septic and appears to be improving   -Follow up with Dr. King ([facility]) after discharge: primary team discussed with [facility] covering physician who recommended in house hematology to follow       #CLL   -dx 2009 s/p FCR, in remission   -received IVIG infusions monthly for 6 years, stopped when diagnosed with AML   -check quantitative immunoglobulins, if low will consider giving dose of IVIG     Daniel Su MD   Hematology/Oncology Fellow   pager [phone]   After 5pm and on weekends page on call fellow  79 yo F w/ AML, CLL, hx L breast lumpectomy p/w R 3rd digit skin lesion x 1 week. Pt reported that she noticed a skin lesion on her right middle finger about 1 week ago, which has worsened over time. Pt described the pain in the lesion as throbbing intermittent, worse when pressure is applied.     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 S/p left foot lesion biopsy  -f/u biopsy results   -c/w broad spectrum antibiotics   -pancytopenia 2/2 chemotherapy   -ANC <500, likely lower today  -would continue ppx medications, acyclovir, voriconazole   -f/u cultures   -f/u ID and Derm recs   -CBC with diff daily   -hold on GCSF for now as patient not overtly septic and appears to be improving   -Follow up with Dr. 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 Pt reported that she noticed a skin lesion on her right middle finger about 1 week ago, which has worsened over time. Pt described the pain in the lesion as throbbing intermittent, worse when pressure is applied.     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 S/p left foot lesion biopsy  -f/u biopsy results   -c/w broad spectrum antibiotics   -pancytopenia 2/2 chemotherapy   -ANC <500, likely lower today  -would continue ppx medications, acyclovir, voriconazole   -f/u cultures   -f/u ID and Derm recs   -CBC with diff daily   -hold on GCSF for now as patient not overtly septic and appears to be improving   -Follow up with Dr. 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

## 2021-02-28 NOTE — PROGRESS NOTE ADULT - ASSESSMENT
79 yo F w/ AML, CLL, hx L breast lumpectomy p/w R 3rd digit skin lesion x 1 week. Pt reported that she noticed a skin lesion on her right middle finger about 1 week ago, which has worsened over time. Pt described the pain in the lesion as throbbing intermittent, worse when pressure is applied.     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

## 2021-02-28 NOTE — CONSULT NOTE ADULT - SUBJECTIVE AND OBJECTIVE BOX
Patient is a 80y old  Female who presents with a chief complaint of neutropenic sepsis (27 Feb 2021 21:31)      HPI:    Pt is a 79 yo F w/ AML, CML, hx L breast lumpectomy p/w R 3rd digit skin lesion x 1 week. Pt reported that she noticed a skin lesion on her right middle finger about 1 week ago, which has worsened over time. Pt described the pain in the lesion as throbbing intermittent, worse when pressure is applied. Pt recently stopped taking levaquin and was switched to cephalexin by her oncologist Dr. Hall. In addition to the cephalexin, she takes acyclovir, and voriconazole. Pt reported that she had worsening pain in the lesion which prompted her to visit [facility], where it was unable to be drained. Her doctor suggested she come to [facility] for dermatologic expertise. In addition to the lesion in her R 3rd digit, she has noticed the starting of new similar lesions on her b/l hands and L foot. Pt says she had a temp of 100.3 and 99.9F earlier in the week, and has intermittent night sweats over the last two weeks. (27 Feb 2021 21:31)    ER vitals:  T 97.8, P 110, /78.  WBC 0.86.  Rt hand xray no cortical erosions, (+) soft tissue prominence of PIP/DIP.  Pt seen by Derm, noted to have right distal third digit with gray-purple ulcerated tender nodule on erythematous base, left dorsal foot with gray-purple nodule on erythematous base, left index finger with pink papule on lateral side and skin colored papule on tip of left third middle finger.   Pt s/p punch biopsies performed for H&E as well as tissue culture to help distinguish between Sweet Syndrome vs infectious etiology.  Pt started on vanco/cefepime.         REVIEW OF SYSTEMS:    CONSTITUTIONAL: No fever, weight loss, or fatigue  EYES: No eye pain, visual disturbances, or discharge  ENMT:  No sore throat  NECK: No pain or stiffness  RESPIRATORY: No cough, wheezing, chills or hemoptysis; No shortness of breath  CARDIOVASCULAR: No chest pain, palpitations, dizziness, or leg swelling  GASTROINTESTINAL: No abdominal or epigastric pain. No nausea, vomiting, or hematemesis; No diarrhea or constipation. No melena or hematochezia.  GENITOURINARY: No dysuria, frequency, hematuria, or incontinence  NEUROLOGICAL: No headaches, memory loss, loss of strength, numbness, or tremors  SKIN: No itching, burning, rashes, or lesions   LYMPH NODES: No enlarged glands  MUSCULOSKELETAL: No joint pain or swelling; No muscle, back, or extremity pain      PAST MEDICAL & SURGICAL HISTORY:      Allergies    Bactrim (Rash)  dapsone (Other)  doxycycline (Rash)  sulfa drugs (Hives; Rash)    Intolerances        FAMILY HISTORY:  No pertinent fam hx in 1st deg relatives    SOCIAL HISTORY:        MEDICATIONS  (STANDING):  cefepime   IVPB      cefepime   IVPB 1000 milliGRAM(s) IV Intermittent every 8 hours  clobetasol 0.05% Cream 1 Application(s) Topical once  pantoprazole    Tablet 40 milliGRAM(s) Oral before breakfast  sodium chloride 0.45%. 1000 milliLiter(s) (60 mL/Hr) IV Continuous <Continuous>  vancomycin  IVPB      vancomycin  IVPB 1000 milliGRAM(s) IV Intermittent every 12 hours    MEDICATIONS  (PRN):  acetaminophen   Tablet .. 650 milliGRAM(s) Oral every 6 hours PRN Temp greater or equal to 38C (100.4F), Mild Pain (1 - 3)  oxycodone    5 mG/acetaminophen 325 mG 1 Tablet(s) Oral every 6 hours PRN Severe Pain (7 - 10)  traMADol 50 milliGRAM(s) Oral every 8 hours PRN Moderate Pain (4 - 6)      Vital Signs Last 24 Hrs  T(C): 36.7 (28 Feb 2021 08:33), Max: 37.2 (27 Feb 2021 19:20)  T(F): 98 (28 Feb 2021 08:33), Max: 98.9 (27 Feb 2021 19:20)  HR: 64 (28 Feb 2021 08:33) (64 - 110)  BP: 145/78 (28 Feb 2021 08:33) (107/62 - 145/78)  BP(mean): 70 (27 Feb 2021 22:02) (70 - 102)  RR: 18 (28 Feb 2021 08:33) (16 - 18)  SpO2: 99% (28 Feb 2021 08:33) (93% - 100%)    PHYSICAL EXAM:    GENERAL: NAD, well-groomed  HEAD:  Atraumatic, Normocephalic  EYES: EOMI, PERRLA, conjunctiva and sclera clear  ENMT: No tonsillar erythema, exudates, or enlargement; Moist mucous membranes  NECK: Supple, No JVD  CHEST/LUNG: Clear to percussion bilaterally; No rales, rhonchi, wheezing, or rubs  HEART: Regular rate and rhythm; No murmurs, rubs, or gallops  ABDOMEN: Soft, Nontender, Nondistended; Bowel sounds present  EXTREMITIES:  2+ Peripheral Pulses, No clubbing, cyanosis, or edema  LYMPH: No lymphadenopathy noted  SKIN: No rashes or lesions    LABS:  CBC Full  -  ( 28 Feb 2021 06:20 )  WBC Count : 0.86 K/uL  RBC Count : 2.22 M/uL  Hemoglobin : 7.0 g/dL  Hematocrit : 22.3 %  Platelet Count - Automated : 147 K/uL  Mean Cell Volume : 100.5 fl  Mean Cell Hemoglobin : 31.5 pg  Mean Cell Hemoglobin Concentration : 31.4 gm/dL  Auto Neutrophil # : x  Auto Lymphocyte # : x  Auto Monocyte # : x  Auto Eosinophil # : x  Auto Basophil # : x  Auto Neutrophil % : x  Auto Lymphocyte % : x  Auto Monocyte % : x  Auto Eosinophil % : x  Auto Basophil % : x      02-28    137  |  101  |  8   ----------------------------<  102<H>  4.2   |  26  |  0.64    Ca    9.0      28 Feb 2021 06:19    TPro  7.0  /  Alb  3.9  /  TBili  0.3  /  DBili  x   /  AST  24  /  ALT  8<L>  /  AlkPhos  88  02-27      LIVER FUNCTIONS - ( 27 Feb 2021 17:53 )  Alb: 3.9 g/dL / Pro: 7.0 g/dL / ALK PHOS: 88 U/L / ALT: 8 U/L / AST: 24 U/L / GGT: x                               MICROBIOLOGY:      Culture - Tissue with Gram Stain (02.28.21 @ 04:39)   Gram Stain:   No polymorphonuclear leukocytes seen per low power field   No organisms seen per oil power field   Specimen Source: .Tissue Other     COVID-19 PCR . (02.27.21 @ 20:29)   COVID-19 PCR: NotDetec: You can help in the fight against COVID-19. [facility] may contact   you to see if you are interested in voluntarily participating in one of   our clinical trials.   Testing is performed using polymerase chain reaction (PCR) or   transcription mediated amplification (TMA). This COVID-19 (SARS-CoV-2)   nucleic acid amplification test was validated by [employer] and is   in use under the FDA Emergency Use Authorization (EUA) for clinical labs   CLIA-certified to perform high complexity testing. Test results should be   correlated with clinical presentation, patient history, and epidemiology.                 RADIOLOGY:      < from: Xray Finger, Right Hand (02.27.21 @ 18:14) >  FINDINGS/  IMPRESSION:  There is no fracture or dislocation.  The joint spaces are preserved.  No cortical erosions or periosteal reactions to suggest advanced osteomyelitis. No tracking subcutaneous gas collections.  Focal soft tissue prominence at the PIP and DIP dorsally.    < end of copied text >             Patient is a 80y old  Female who presents with a chief complaint of neutropenic sepsis (27 Feb 2021 21:31)      HPI:    Pt is a 79 yo F w/ AML, CML, hx L breast lumpectomy p/w R 3rd digit skin lesion x 1 week. Pt reported that she noticed a skin lesion on her right middle finger about 1 week ago, which has worsened over time. Pt described the pain in the lesion as throbbing intermittent, worse when pressure is applied. Pt recently stopped taking levaquin and was switched to cephalexin by her oncologist Dr. Hall. In addition to the cephalexin, she takes acyclovir, and voriconazole. Pt reported that she had worsening pain in the lesion which prompted her to visit [facility], where it was unable to be drained. Her doctor suggested she come to [facility] for dermatologic expertise. In addition to the lesion in her R 3rd digit, she has noticed the starting of new similar lesions on her b/l hands and L foot. Pt says she had a temp of 100.3 and 99.9F earlier in the week, and has intermittent night sweats over the last two weeks. (27 Feb 2021 21:31)    Pt was also treated for thrush recently, states while on chemotherapy WBC dropped to 0.4.  Developed a left tongue lateral lesion which has now improved.     ER vitals:  T 97.8, P 110, /78.  WBC 0.86.  Rt hand xray no cortical erosions, (+) soft tissue prominence of PIP/DIP.  Pt seen by Derm, noted to have right distal third digit with gray-purple ulcerated tender nodule on erythematous base, left dorsal foot with gray-purple nodule on erythematous base, left index finger with pink papule on lateral side and skin colored papule on tip of left third middle finger.   Pt s/p punch biopsies performed for H&E as well as tissue culture to help distinguish between Sweet Syndrome vs infectious etiology.  Pt started on vanco/cefepime.         REVIEW OF SYSTEMS:    CONSTITUTIONAL: No fever, weight loss, or fatigue  EYES: No eye pain, visual disturbances, or discharge  ENMT:  No sore throat  NECK: No pain or stiffness  RESPIRATORY: No cough, wheezing, chills or hemoptysis; No shortness of breath  CARDIOVASCULAR: No chest pain, palpitations, dizziness, or leg swelling  GASTROINTESTINAL: No abdominal or epigastric pain. No nausea, vomiting, or hematemesis; No diarrhea or constipation. No melena or hematochezia.  GENITOURINARY: No dysuria, frequency, hematuria, or incontinence  NEUROLOGICAL: No headaches, memory loss, loss of strength, numbness, or tremors  SKIN: No itching, burning, rashes, or lesions   LYMPH NODES: No enlarged glands  MUSCULOSKELETAL: No joint pain or swelling; No muscle, back, or extremity pain      PAST MEDICAL & SURGICAL HISTORY:      Allergies    Bactrim (Rash)  dapsone (Other)  doxycycline (Rash)  sulfa drugs (Hives; Rash)    Intolerances        FAMILY HISTORY:  No pertinent fam hx in 1st deg relatives    SOCIAL HISTORY:  Denies smoking, ivdu, etoh      MEDICATIONS  (STANDING):  cefepime   IVPB      cefepime   IVPB 1000 milliGRAM(s) IV Intermittent every 8 hours  clobetasol 0.05% Cream 1 Application(s) Topical once  pantoprazole    Tablet 40 milliGRAM(s) Oral before breakfast  sodium chloride 0.45%. 1000 milliLiter(s) (60 mL/Hr) IV Continuous <Continuous>  vancomycin  IVPB      vancomycin  IVPB 1000 milliGRAM(s) IV Intermittent every 12 hours    MEDICATIONS  (PRN):  acetaminophen   Tablet .. 650 milliGRAM(s) Oral every 6 hours PRN Temp greater or equal to 38C (100.4F), Mild Pain (1 - 3)  oxycodone    5 mG/acetaminophen 325 mG 1 Tablet(s) Oral every 6 hours PRN Severe Pain (7 - 10)  traMADol 50 milliGRAM(s) Oral every 8 hours PRN Moderate Pain (4 - 6)      Vital Signs Last 24 Hrs  T(C): 36.7 (28 Feb 2021 08:33), Max: 37.2 (27 Feb 2021 19:20)  T(F): 98 (28 Feb 2021 08:33), Max: 98.9 (27 Feb 2021 19:20)  HR: 64 (28 Feb 2021 08:33) (64 - 110)  BP: 145/78 (28 Feb 2021 08:33) (107/62 - 145/78)  BP(mean): 70 (27 Feb 2021 22:02) (70 - 102)  RR: 18 (28 Feb 2021 08:33) (16 - 18)  SpO2: 99% (28 Feb 2021 08:33) (93% - 100%)    PHYSICAL EXAM:    GENERAL: NAD, well-groomed  HEAD:  Atraumatic, Normocephalic  EYES: EOMI, PERRLA, conjunctiva and sclera clear  ENMT: No tonsillar erythema, exudates, or enlargement; Moist mucous membranes  NECK: Supple, No JVD  CHEST/LUNG: Clear to percussion bilaterally; No rales, rhonchi, wheezing, or rubs  HEART: Regular rate and rhythm; No murmurs, rubs, or gallops  ABDOMEN: Soft, Nontender, Nondistended; Bowel sounds present  EXTREMITIES:  2+ Peripheral Pulses, No clubbing, cyanosis, or edema  LYMPH: No lymphadenopathy noted  SKIN: Purple nodule over rt middle finger, small papule on medial rt index finger, L hand small papule over tip of middle finger, L ft dorsum lesion (biopsied).      LABS:  CBC Full  -  ( 28 Feb 2021 06:20 )  WBC Count : 0.86 K/uL  RBC Count : 2.22 M/uL  Hemoglobin : 7.0 g/dL  Hematocrit : 22.3 %  Platelet Count - Automated : 147 K/uL  Mean Cell Volume : 100.5 fl  Mean Cell Hemoglobin : 31.5 pg  Mean Cell Hemoglobin Concentration : 31.4 gm/dL  Auto Neutrophil # : x  Auto Lymphocyte # : x  Auto Monocyte # : x  Auto Eosinophil # : x  Auto Basophil # : x  Auto Neutrophil % : x  Auto Lymphocyte % : x  Auto Monocyte % : x  Auto Eosinophil % : x  Auto Basophil % : x      02-28    137  |  101  |  8   ----------------------------<  102<H>  4.2   |  26  |  0.64    Ca    9.0      28 Feb 2021 06:19    TPro  7.0  /  Alb  3.9  /  TBili  0.3  /  DBili  x   /  AST  24  /  ALT  8<L>  /  AlkPhos  88  02-27      LIVER FUNCTIONS - ( 27 Feb 2021 17:53 )  Alb: 3.9 g/dL / Pro: 7.0 g/dL / ALK PHOS: 88 U/L / ALT: 8 U/L / AST: 24 U/L / GGT: x                               MICROBIOLOGY:      Culture - Tissue with Gram Stain (02.28.21 @ 04:39)   Gram Stain:   No polymorphonuclear leukocytes seen per low power field   No organisms seen per oil power field   Specimen Source: .Tissue Other     COVID-19 PCR . (02.27.21 @ 20:29)   COVID-19 PCR: NotDetec: You can help in the fight against COVID-19. [facility] may contact   you to see if you are interested in voluntarily participating in one of   our clinical trials.   Testing is performed using polymerase chain reaction (PCR) or   transcription mediated amplification (TMA). This COVID-19 (SARS-CoV-2)   nucleic acid amplification test was validated by [facility] and is   in use under the FDA Emergency Use Authorization (EUA) for clinical labs   CLIA-certified to perform high complexity testing. Test results should be   correlated with clinical presentation, patient history, and epidemiology.                 RADIOLOGY:      < from: Xray Finger, Right Hand (02.27.21 @ 18:14) >  FINDINGS/  IMPRESSION:  There is no fracture or dislocation.  The joint spaces are preserved.  No cortical erosions or periosteal reactions to suggest advanced osteomyelitis. No tracking subcutaneous gas collections.  Focal soft tissue prominence at the PIP and DIP dorsally.    < end of copied text >

## 2021-03-01 LAB
ANION GAP SERPL CALC-SCNC: 13 MMOL/L — SIGNIFICANT CHANGE UP (ref 5–17)
B BURGDOR C6 AB SER-ACNC: NEGATIVE — SIGNIFICANT CHANGE UP
B BURGDOR IGG+IGM SER-ACNC: 0.09 INDEX — SIGNIFICANT CHANGE UP (ref 0.01–0.89)
B19V IGG SER-ACNC: 1.33 INDEX — HIGH (ref 0–0.9)
B19V IGG+IGM SER-IMP: POSITIVE
B19V IGG+IGM SER-IMP: SIGNIFICANT CHANGE UP
B19V IGM FLD-ACNC: 0.27 INDEX — SIGNIFICANT CHANGE UP (ref 0–0.9)
B19V IGM SER-ACNC: NEGATIVE — SIGNIFICANT CHANGE UP
BLD GP AB SCN SERPL QL: NEGATIVE — SIGNIFICANT CHANGE UP
BUN SERPL-MCNC: 8 MG/DL — SIGNIFICANT CHANGE UP (ref 7–23)
CALCIUM SERPL-MCNC: 8.8 MG/DL — SIGNIFICANT CHANGE UP (ref 8.4–10.5)
CHLORIDE SERPL-SCNC: 102 MMOL/L — SIGNIFICANT CHANGE UP (ref 96–108)
CMV IGG FLD QL: 0.74 U/ML — HIGH
CMV IGG SERPL-IMP: POSITIVE
CMV IGM FLD-ACNC: <8 AU/ML — SIGNIFICANT CHANGE UP
CMV IGM SERPL QL: NEGATIVE — SIGNIFICANT CHANGE UP
CO2 SERPL-SCNC: 23 MMOL/L — SIGNIFICANT CHANGE UP (ref 22–31)
CREAT SERPL-MCNC: 0.63 MG/DL — SIGNIFICANT CHANGE UP (ref 0.5–1.3)
CRP SERPL-MCNC: 8.68 MG/DL — HIGH (ref 0–0.4)
CULTURE RESULTS: NO GROWTH — SIGNIFICANT CHANGE UP
CULTURE RESULTS: SIGNIFICANT CHANGE UP
ERYTHROCYTE [SEDIMENTATION RATE] IN BLOOD: 43 MM/HR — HIGH (ref 0–20)
GLUCOSE SERPL-MCNC: 105 MG/DL — HIGH (ref 70–99)
HCT VFR BLD CALC: 19.5 % — CRITICAL LOW (ref 34.5–45)
HGB BLD-MCNC: 6.3 G/DL — CRITICAL LOW (ref 11.5–15.5)
HIV 1+2 AB+HIV1 P24 AG SERPL QL IA: SIGNIFICANT CHANGE UP
IGA FLD-MCNC: 286 MG/DL — SIGNIFICANT CHANGE UP (ref 84–499)
IGG FLD-MCNC: 401 MG/DL — LOW (ref 610–1660)
IGM SERPL-MCNC: 73 MG/DL — SIGNIFICANT CHANGE UP (ref 35–242)
MCHC RBC-ENTMCNC: 32.3 GM/DL — SIGNIFICANT CHANGE UP (ref 32–36)
MCHC RBC-ENTMCNC: 32.3 PG — SIGNIFICANT CHANGE UP (ref 27–34)
MCV RBC AUTO: 100 FL — SIGNIFICANT CHANGE UP (ref 80–100)
NRBC # BLD: 0 /100 WBCS — SIGNIFICANT CHANGE UP (ref 0–0)
PLATELET # BLD AUTO: 136 K/UL — LOW (ref 150–400)
POTASSIUM SERPL-MCNC: 3.8 MMOL/L — SIGNIFICANT CHANGE UP (ref 3.5–5.3)
POTASSIUM SERPL-SCNC: 3.8 MMOL/L — SIGNIFICANT CHANGE UP (ref 3.5–5.3)
RBC # BLD: 1.95 M/UL — LOW (ref 3.8–5.2)
RBC # FLD: 16.1 % — HIGH (ref 10.3–14.5)
RH IG SCN BLD-IMP: POSITIVE — SIGNIFICANT CHANGE UP
SODIUM SERPL-SCNC: 138 MMOL/L — SIGNIFICANT CHANGE UP (ref 135–145)
SPECIMEN SOURCE: SIGNIFICANT CHANGE UP
SPECIMEN SOURCE: SIGNIFICANT CHANGE UP
T PALLIDUM AB TITR SER: NEGATIVE — SIGNIFICANT CHANGE UP
VANCOMYCIN TROUGH SERPL-MCNC: 9.4 UG/ML — LOW (ref 10–20)
WBC # BLD: 0.65 K/UL — CRITICAL LOW (ref 3.8–10.5)
WBC # FLD AUTO: 0.65 K/UL — CRITICAL LOW (ref 3.8–10.5)

## 2021-03-01 RX ADMIN — Medication 1 APPLICATION(S): at 19:41

## 2021-03-01 RX ADMIN — OXYCODONE AND ACETAMINOPHEN 1 TABLET(S): 5; 325 TABLET ORAL at 04:44

## 2021-03-01 RX ADMIN — POSACONAZOLE 300 MILLIGRAM(S): 100 TABLET, DELAYED RELEASE ORAL at 12:31

## 2021-03-01 RX ADMIN — Medication 400 MILLIGRAM(S): at 05:58

## 2021-03-01 RX ADMIN — PANTOPRAZOLE SODIUM 40 MILLIGRAM(S): 20 TABLET, DELAYED RELEASE ORAL at 05:59

## 2021-03-01 RX ADMIN — TRAMADOL HYDROCHLORIDE 50 MILLIGRAM(S): 50 TABLET ORAL at 17:36

## 2021-03-01 RX ADMIN — CEFEPIME 100 MILLIGRAM(S): 1 INJECTION, POWDER, FOR SOLUTION INTRAMUSCULAR; INTRAVENOUS at 21:55

## 2021-03-01 RX ADMIN — Medication 250 MILLIGRAM(S): at 06:54

## 2021-03-01 RX ADMIN — Medication 250 MILLIGRAM(S): at 18:58

## 2021-03-01 RX ADMIN — SENNA PLUS 2 TABLET(S): 8.6 TABLET ORAL at 21:56

## 2021-03-01 RX ADMIN — POLYETHYLENE GLYCOL 3350 17 GRAM(S): 17 POWDER, FOR SOLUTION ORAL at 12:31

## 2021-03-01 RX ADMIN — CEFEPIME 100 MILLIGRAM(S): 1 INJECTION, POWDER, FOR SOLUTION INTRAMUSCULAR; INTRAVENOUS at 05:59

## 2021-03-01 RX ADMIN — OXYCODONE AND ACETAMINOPHEN 1 TABLET(S): 5; 325 TABLET ORAL at 23:21

## 2021-03-01 RX ADMIN — OXYCODONE AND ACETAMINOPHEN 1 TABLET(S): 5; 325 TABLET ORAL at 12:31

## 2021-03-01 RX ADMIN — CEFEPIME 100 MILLIGRAM(S): 1 INJECTION, POWDER, FOR SOLUTION INTRAMUSCULAR; INTRAVENOUS at 13:18

## 2021-03-01 RX ADMIN — Medication 400 MILLIGRAM(S): at 17:36

## 2021-03-01 NOTE — PROGRESS NOTE ADULT - SUBJECTIVE AND OBJECTIVE BOX
Patient is a 80y old  Female who presents with a chief complaint of neutropenic sepsis (01 Mar 2021 14:15)      SUBJECTIVE / OVERNIGHT EVENTS: ptn w ongoing pain at the "blistering lesions" HH dropped, getting PRBC transfusion. wbc still low and ptn is neutropenic, spoke w heme. neulasta will not be administered since ptn has h/o AML. awaiting derm F/U to initiate treatment of "sweet syndrome"    MEDICATIONS  (STANDING):  acyclovir   Oral Tab/Cap 400 milliGRAM(s) Oral two times a day  cefepime   IVPB      cefepime   IVPB 1000 milliGRAM(s) IV Intermittent every 8 hours  clobetasol 0.05% Ointment 1 Application(s) Topical two times a day  pantoprazole    Tablet 40 milliGRAM(s) Oral before breakfast  polyethylene glycol 3350 17 Gram(s) Oral daily  posaconazole DR Tablet 300 milliGRAM(s) Oral daily  senna 2 Tablet(s) Oral at bedtime  sodium chloride 0.45%. 1000 milliLiter(s) (60 mL/Hr) IV Continuous <Continuous>  vancomycin  IVPB      vancomycin  IVPB 1000 milliGRAM(s) IV Intermittent every 12 hours    MEDICATIONS  (PRN):  acetaminophen   Tablet .. 650 milliGRAM(s) Oral every 6 hours PRN Temp greater or equal to 38C (100.4F), Mild Pain (1 - 3)  oxycodone    5 mG/acetaminophen 325 mG 1 Tablet(s) Oral every 6 hours PRN Severe Pain (7 - 10)  traMADol 50 milliGRAM(s) Oral every 8 hours PRN Moderate Pain (4 - 6)      Vital Signs Last 24 Hrs  T(F): 97.7 (21 @ 18:45), Max: 99 (21 @ 11:55)  HR: 103 (21 @ 18:45) (66 - 103)  BP: 146/79 (21 @ 18:45) (109/69 - 146/80)  RR: 18 (21 @ 18:45) (18 - 18)  SpO2: 98% (21 @ 18:45) (94% - 98%)  Telemetry:   CAPILLARY BLOOD GLUCOSE        I&O's Summary    2021 07:01  -  01 Mar 2021 07:00  --------------------------------------------------------  IN:  mL / OUT: 1400 mL / NET: 620 mL    01 Mar 2021 07:01  -  01 Mar 2021 19:04  --------------------------------------------------------  IN: 500 mL / OUT: 0 mL / NET: 500 mL        PHYSICAL EXAM:  GENERAL: NAD, well-developed  HEAD:  Atraumatic, Normocephalic  EYES: EOMI, PERRLA, conjunctiva and sclera clear  NECK: Supple, No JVD  CHEST/LUNG: Clear to auscultation bilaterally; No wheeze  HEART: Regular rate and rhythm; No murmurs, rubs, or gallops  ABDOMEN: Soft, Nontender, Nondistended; Bowel sounds present  EXTREMITIES:  2+ Peripheral Pulses, No clubbing, cyanosis, or edema  PSYCH: AAOx3  NEUROLOGY: non-focal  SKIN: No rashes or lesions    LABS:                        6.3    0.65  )-----------( 136      ( 01 Mar 2021 07:12 )             19.5     -    138  |  102  |  8   ----------------------------<  105<H>  3.8   |  23  |  0.63    Ca    8.8      01 Mar 2021 07:12            Urinalysis Basic - ( 2021 16:04 )    Color: Colorless / Appearance: Clear / S.008 / pH: x  Gluc: x / Ketone: Negative  / Bili: Negative / Urobili: Negative   Blood: x / Protein: Negative / Nitrite: Negative   Leuk Esterase: Negative / RBC: x / WBC x   Sq Epi: x / Non Sq Epi: x / Bacteria: x        RADIOLOGY & ADDITIONAL TESTS:    Imaging Personally Reviewed:    Consultant(s) Notes Reviewed:      Care Discussed with Consultants/Other Providers:

## 2021-03-01 NOTE — PROGRESS NOTE ADULT - ASSESSMENT
81 yo F w/ AML, CLL, hx L breast lumpectomy p/w R 3rd digit skin lesion x 1 week. Pt reported that she noticed a skin lesion on her right middle finger about 1 week ago, which has worsened over time. Pt described the pain in the lesion as throbbing intermittent, worse when pressure is applied.     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 S/p left foot lesion biopsy. so far all Cx NTD  - hemodynamically stable,   -c/w broad spectrum antibiotics   -pancytopenia 2/2 chemotherapy   -ANC <500  -continue ppx medications, acyclovir, voriconazole   -f/u cultures   -f/u ID and Derm recs   -CBC with diff daily   - ptn w ongoing pain at the "blistering lesions" HH dropped, getting PRBC transfusion. wbc still low and ptn is neutropenic, spoke w heme. neulasta will not be administered since ptn has h/o AML. 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

## 2021-03-01 NOTE — PROGRESS NOTE ADULT - SUBJECTIVE AND OBJECTIVE BOX
Infectious Diseases progress note:    Subjective: No acute o/n events.  Afebrile    ROS:  CONSTITUTIONAL:  No fever, chills, rigors  CARDIOVASCULAR:  No chest pain or palpitations  RESPIRATORY:   No SOB, cough, dyspnea on exertion.  No wheezing  GASTROINTESTINAL:  No abd pain, N/V, diarrhea/constipation  EXTREMITIES:  No swelling or joint pain  GENITOURINARY:  No burning on urination, increased frequency or urgency.  No flank pain  NEUROLOGIC:  No HA, visual disturbances  SKIN: No rashes    Allergies    Bactrim (Rash)  dapsone (Other)  doxycycline (Rash)  sulfa drugs (Hives; Rash)    Intolerances        ANTIBIOTICS/RELEVANT:  antimicrobials  acyclovir   Oral Tab/Cap 400 milliGRAM(s) Oral two times a day  cefepime   IVPB      cefepime   IVPB 1000 milliGRAM(s) IV Intermittent every 8 hours  posaconazole DR Tablet 300 milliGRAM(s) Oral daily  vancomycin  IVPB      vancomycin  IVPB 1000 milliGRAM(s) IV Intermittent every 12 hours    immunologic:    OTHER:  acetaminophen   Tablet .. 650 milliGRAM(s) Oral every 6 hours PRN  oxycodone    5 mG/acetaminophen 325 mG 1 Tablet(s) Oral every 6 hours PRN  pantoprazole    Tablet 40 milliGRAM(s) Oral before breakfast  polyethylene glycol 3350 17 Gram(s) Oral daily  senna 2 Tablet(s) Oral at bedtime  sodium chloride 0.45%. 1000 milliLiter(s) IV Continuous <Continuous>  traMADol 50 milliGRAM(s) Oral every 8 hours PRN      Objective:  Vital Signs Last 24 Hrs  T(C): 37.1 (01 Mar 2021 12:39), Max: 37.2 (01 Mar 2021 11:55)  T(F): 98.8 (01 Mar 2021 12:39), Max: 99 (01 Mar 2021 11:55)  HR: 87 (01 Mar 2021 12:39) (87 - 95)  BP: 122/74 (01 Mar 2021 12:39) (111/67 - 146/80)  BP(mean): --  RR: 18 (01 Mar 2021 12:39) (18 - 18)  SpO2: 97% (01 Mar 2021 12:39) (94% - 97%)    PHYSICAL EXAM:  Constitutional:NAD  Eyes:JUSTINO, EOMI  Ear/Nose/Throat: no thrush, mucositis.  Moist mucous membranes	  Neck:no JVD, no lymphadenopathy, supple  Respiratory: CTA torrie  Cardiovascular: S1S2 RRR, no murmurs  Gastrointestinal:soft, nontender,  nondistended (+) BS  Extremities:no e/e/c  Skin:  stable skin lesions        LABS:                        6.3    0.65  )-----------( 136      ( 01 Mar 2021 07:12 )             19.5         138  |  102  |  8   ----------------------------<  105<H>  3.8   |  23  |  0.63    Ca    8.8      01 Mar 2021 07:12    TPro  7.0  /  Alb  3.9  /  TBili  0.3  /  DBili  x   /  AST  24  /  ALT  8<L>  /  AlkPhos  88      PT/INR - ( 2021 17:53 )   PT: 13.9 sec;   INR: 1.17 ratio         PTT - ( 2021 17:53 )  PTT:37.5 sec  Urinalysis Basic - ( 2021 16:04 )    Color: Colorless / Appearance: Clear / S.008 / pH: x  Gluc: x / Ketone: Negative  / Bili: Negative / Urobili: Negative   Blood: x / Protein: Negative / Nitrite: Negative   Leuk Esterase: Negative / RBC: x / WBC x   Sq Epi: x / Non Sq Epi: x / Bacteria: x              Vancomycin Level, Trough: 9.4 ug/mL ( @ 07:12)              MICROBIOLOGY:      Babesia microti PCR, Blood. (21 @ 08:52)   Specimen Source: .Blood   Culture Results:   Babesia microti PCR   Results: NOT detected   ***************Result Note*************   The detection of Babesia microti by PCR has only been   validated for whole blood; this test has not been approved   by the US Food and Drug Administration (FDA). Performance   characteristics of this assay have been determined by   [facility]. The clinical significance   of results should be considered in conjunction with the   overall clinical presentation of the patient. Result is not   intended to be used as the sole means for clinical diagnosis   or patient management decisions.   One negative sample does not necessarily rule   out the presence of a parasitic infection.       Borrelia burgdorferi IgG/IgM Antibodies (21 @ 08:24)   LYME IgG/IgM Antibodies Result: 0.09 Index   Lyme C6 Interpretation: Negative: METHOD: Liaison Chemiluminescent Immunoassay   Reference Range: (values expressed as Lyme Index )   < 0.90 Negative   0.90 - 1.09 Equivocal   >= 1.10 Positive   CDC/ASTPHLD Guidelines recommend that all samples judged equivocal or   positive be re-tested by immunoblot for confirmation of results.     Herpes Simplex Virus 1/2 Surveillance, PCR (21 @ 02:21)   Herpes Simplex Virus 1/2 Surveillance PCR Source: foot   Herpes Simplex Virus 1/2 Surveillance PCR Result: NotDete: HSV 1/2 and VZV assay is a Real-Time PCR test for the qualitative   detection and differentiation of herpes simplex virus type 1 (HSV1), 2   (HSV2) and varicella-zoster virus (VZV) DNA in samples. The result should   be interpreted with consideration ofall clinical and laboratory findings.       HIV-1/2 Antigen/Antibody Screen by CMIA (21 @ 08:25)   HIV-1/2 Combo Result: Nonreact: The HIV Ag/Ab Combo test performed screens for HIV-1 p24 antigen,   antibodies to HIV-1 (group M and group O), and antibodies to HIV-2. All   specimens repeatedly reactive will reflex to an HIV 1/2 antibody   confirmation and differentiation test. This assay detects p24 antigen   which may be present prior to the development of HIV antibodies,   therefore a reactive result with a negative HIV 1/2 AB Confirmation   should be followed up with HIV-1 RNA, HIV-2 RNA and repeat testing in 4-8   weeks. A nonreactive result does not preclude previous exposure to or   infection with HIV-1 or HIV-2.       Culture - Urine (21 @ 18:10)   Specimen Source: .Urine Clean Catch (Midstream)   Culture Results:   No growth         Culture - Fungal, Tissue (21 @ 04:39)   Specimen Source: .Tissue right foot   Culture Results:   Testing in progress       Culture - Tissue with Gram Stain (21 @ 04:39)   Gram Stain:   No polymorphonuclear leukocytes seen per low power field   No organisms seen per oil power field   Specimen Source: .Tissue Other   Culture Results:   No growth to date.     Culture - Acid Fast - Tissue w/Smear (21 @ 04:39)   Specimen Source: .Tissue Other   Acid Fast Bacilli Smear:   No acid fast bacilli seen by fluorochrome stain     Culture - Blood (21 @ 22:55)   Specimen Source: .Blood Blood-Peripheral   Culture Results:   No growth to date.     RADIOLOGY & ADDITIONAL STUDIES:    < from: Xray Finger, Right Hand (21 @ 18:14) >    FINDINGS/  IMPRESSION:  There is no fracture or dislocation.  The joint spaces are preserved.  No cortical erosions or periosteal reactions to suggest advanced osteomyelitis. No tracking subcutaneous gas collections.  Focal soft tissue prominence at the PIP and DIP dorsally.    < end of copied text >

## 2021-03-01 NOTE — PROGRESS NOTE ADULT - ASSESSMENT
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 Pt reported that she noticed a skin lesion on her right middle finger about 1 week ago, which has worsened over time. Pt described the pain in the lesion as throbbing intermittent, worse when pressure is applied. Pt recently stopped taking levaquin and was switched to cephalexin by her oncologist Dr. Hall. In addition to the cephalexin, she takes acyclovir, and voriconazole. Pt reported that she had worsening pain in the lesion which prompted her to visit [facility], where it was unable to be drained. Her doctor suggested she come to [facility] for dermatologic expertise. In addition to the lesion in her R 3rd digit, she has noticed the starting of new similar lesions on her b/l hands and L foot. Pt says she had a temp of 100.3 and 99.9F earlier in the week, and has intermittent night sweats over the last two weeks. (27 Feb 2021 21:31)    Pt was also treated for thrush recently, states while on chemotherapy WBC dropped to 0.4.  Developed a left tongue lateral lesion which has now improved.     ER vitals:  T 97.8, P 110, /78.  WBC 0.86.  Rt hand xray no cortical erosions, (+) soft tissue prominence of PIP/DIP.  Pt seen by Derm, noted to have right distal third digit with gray-purple ulcerated tender nodule on erythematous base, left dorsal foot with gray-purple nodule on erythematous base, left index finger with pink papule on lateral side and skin colored papule on tip of left third middle finger.   Pt s/p punch biopsies performed for H&E as well as tissue culture to help distinguish between Sweet Syndrome vs infectious etiology.  Pt started on vanco/cefepime.       Neutropenic fever:    - Pt with low grade fevers at home, (+) leukopenia.  Pt on chemotherapy for AML.  Cont vanco/cefepime for now (cover for MRSA and pseudomonas).  Pt with several skin lesions, s/p recent attempted I&D of rt hand middle finger at [facility]    - Pt on acyclovir and posaconazole for additional prophylaxis against HSV/VZV and fungal infection.      - Check blood cx x 2 - ngtd    - Monitor Cbc with diff.    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  Send ESR, CRP (noted elevation)    - r/o infectious causes such as fungal, mycobacterial, bacterial sepsis.  f/u blood cx, wound cx (neg thus far)    - Check HIV (-), EBV/CMV, RPR, parvovirus, erhrlichia/babesia (- pcr)/Lyme (-).  Pt denies recent travel, bug or tick bites, sick contacts.     Will followNo Rai  [phone]

## 2021-03-02 DIAGNOSIS — K59.00 CONSTIPATION, UNSPECIFIED: ICD-10-CM

## 2021-03-02 DIAGNOSIS — L98.9 DISORDER OF THE SKIN AND SUBCUTANEOUS TISSUE, UNSPECIFIED: ICD-10-CM

## 2021-03-02 DIAGNOSIS — D64.9 ANEMIA, UNSPECIFIED: ICD-10-CM

## 2021-03-02 DIAGNOSIS — D70.9 NEUTROPENIA, UNSPECIFIED: ICD-10-CM

## 2021-03-02 LAB
BASOPHILS # BLD AUTO: 0.01 K/UL — SIGNIFICANT CHANGE UP (ref 0–0.2)
BASOPHILS NFR BLD AUTO: 0.9 % — SIGNIFICANT CHANGE UP (ref 0–2)
EOSINOPHIL # BLD AUTO: 0.01 K/UL — SIGNIFICANT CHANGE UP (ref 0–0.5)
EOSINOPHIL NFR BLD AUTO: 1.8 % — SIGNIFICANT CHANGE UP (ref 0–6)
HCT VFR BLD CALC: 23 % — LOW (ref 34.5–45)
HGB BLD-MCNC: 7.7 G/DL — LOW (ref 11.5–15.5)
LYMPHOCYTES # BLD AUTO: 0.36 K/UL — LOW (ref 1–3.3)
LYMPHOCYTES # BLD AUTO: 47.3 % — HIGH (ref 13–44)
MANUAL SMEAR VERIFICATION: SIGNIFICANT CHANGE UP
MCHC RBC-ENTMCNC: 32.4 PG — SIGNIFICANT CHANGE UP (ref 27–34)
MCHC RBC-ENTMCNC: 33.5 GM/DL — SIGNIFICANT CHANGE UP (ref 32–36)
MCV RBC AUTO: 96.6 FL — SIGNIFICANT CHANGE UP (ref 80–100)
MONOCYTES # BLD AUTO: 0 K/UL — SIGNIFICANT CHANGE UP (ref 0–0.9)
MONOCYTES NFR BLD AUTO: 0 % — LOW (ref 2–14)
NEUTROPHILS # BLD AUTO: 0.38 K/UL — LOW (ref 1.8–7.4)
NEUTROPHILS NFR BLD AUTO: 49.1 % — SIGNIFICANT CHANGE UP (ref 43–77)
NEUTS BAND # BLD: 0.9 % — SIGNIFICANT CHANGE UP (ref 0–8)
PLAT MORPH BLD: NORMAL — SIGNIFICANT CHANGE UP
PLATELET # BLD AUTO: 143 K/UL — LOW (ref 150–400)
RBC # BLD: 2.38 M/UL — LOW (ref 3.8–5.2)
RBC # FLD: 17.2 % — HIGH (ref 10.3–14.5)
RBC BLD AUTO: SIGNIFICANT CHANGE UP
WBC # BLD: 0.76 K/UL — CRITICAL LOW (ref 3.8–10.5)
WBC # FLD AUTO: 0.76 K/UL — CRITICAL LOW (ref 3.8–10.5)

## 2021-03-02 PROCEDURE — 99233 SBSQ HOSP IP/OBS HIGH 50: CPT

## 2021-03-02 PROCEDURE — 72193 CT PELVIS W/DYE: CPT | Mod: 26

## 2021-03-02 RX ORDER — CHOLECALCIFEROL (VITAMIN D3) 125 MCG
1000 CAPSULE ORAL DAILY
Refills: 0 | Status: DISCONTINUED | OUTPATIENT
Start: 2021-03-02 | End: 2021-03-05

## 2021-03-02 RX ORDER — CALCIUM CARBONATE 500(1250)
1 TABLET ORAL DAILY
Refills: 0 | Status: DISCONTINUED | OUTPATIENT
Start: 2021-03-02 | End: 2021-03-05

## 2021-03-02 RX ORDER — ALPRAZOLAM 0.25 MG
0.5 TABLET ORAL ONCE
Refills: 0 | Status: DISCONTINUED | OUTPATIENT
Start: 2021-03-02 | End: 2021-03-02

## 2021-03-02 RX ORDER — POLYETHYLENE GLYCOL 3350 17 G/17G
17 POWDER, FOR SOLUTION ORAL
Refills: 0 | Status: DISCONTINUED | OUTPATIENT
Start: 2021-03-02 | End: 2021-03-05

## 2021-03-02 RX ADMIN — POSACONAZOLE 300 MILLIGRAM(S): 100 TABLET, DELAYED RELEASE ORAL at 10:29

## 2021-03-02 RX ADMIN — CEFEPIME 100 MILLIGRAM(S): 1 INJECTION, POWDER, FOR SOLUTION INTRAMUSCULAR; INTRAVENOUS at 22:38

## 2021-03-02 RX ADMIN — Medication 1 APPLICATION(S): at 17:57

## 2021-03-02 RX ADMIN — OXYCODONE AND ACETAMINOPHEN 1 TABLET(S): 5; 325 TABLET ORAL at 06:46

## 2021-03-02 RX ADMIN — CEFEPIME 100 MILLIGRAM(S): 1 INJECTION, POWDER, FOR SOLUTION INTRAMUSCULAR; INTRAVENOUS at 13:36

## 2021-03-02 RX ADMIN — SENNA PLUS 2 TABLET(S): 8.6 TABLET ORAL at 22:37

## 2021-03-02 RX ADMIN — PANTOPRAZOLE SODIUM 40 MILLIGRAM(S): 20 TABLET, DELAYED RELEASE ORAL at 06:34

## 2021-03-02 RX ADMIN — Medication 400 MILLIGRAM(S): at 06:34

## 2021-03-02 RX ADMIN — Medication 250 MILLIGRAM(S): at 06:47

## 2021-03-02 RX ADMIN — POLYETHYLENE GLYCOL 3350 17 GRAM(S): 17 POWDER, FOR SOLUTION ORAL at 22:38

## 2021-03-02 RX ADMIN — Medication 400 MILLIGRAM(S): at 18:01

## 2021-03-02 RX ADMIN — OXYCODONE AND ACETAMINOPHEN 1 TABLET(S): 5; 325 TABLET ORAL at 14:39

## 2021-03-02 RX ADMIN — OXYCODONE AND ACETAMINOPHEN 1 TABLET(S): 5; 325 TABLET ORAL at 22:49

## 2021-03-02 RX ADMIN — CEFEPIME 100 MILLIGRAM(S): 1 INJECTION, POWDER, FOR SOLUTION INTRAMUSCULAR; INTRAVENOUS at 06:35

## 2021-03-02 RX ADMIN — Medication 0.5 MILLIGRAM(S): at 00:22

## 2021-03-02 RX ADMIN — Medication 60 MILLIGRAM(S): at 17:56

## 2021-03-02 RX ADMIN — Medication 250 MILLIGRAM(S): at 17:56

## 2021-03-02 RX ADMIN — Medication 1 APPLICATION(S): at 08:10

## 2021-03-02 RX ADMIN — POLYETHYLENE GLYCOL 3350 17 GRAM(S): 17 POWDER, FOR SOLUTION ORAL at 10:29

## 2021-03-02 RX ADMIN — Medication 1 TABLET(S): at 22:37

## 2021-03-02 NOTE — CONSULT NOTE ADULT - PROBLEM SELECTOR RECOMMENDATION 9
likely multifactorial, pain medication contributing. Had a single BM today on miralax.  -increased miralax to BID, can add movantik if continues to have constipation in the setting of narcotic use  -discontinued suppositories/enemas for now in the setting of neutropenia

## 2021-03-02 NOTE — CONSULT NOTE ADULT - ATTENDING COMMENTS
Differential diagnosis and plan of care discussed with patient after the evaluation  75 Minutes spent on total encounter of which more than fifty percent of the encounter was spent counseling and/or coordinating care by the attending physician.  Advanced care planning was discussed with the patient and/or surrogate decision makers. Advanced care planning forms were discussed. The risks benefits and alternatives to pertinent gastrointestinal procedures and interventions were discussed in detail and all questions were answered. Duration: 30 Minutes.      Todd Curry M.D.   Gastroenterology and Hepatology  Cell: [phone]
Agree with above. Pt seen and evaluated at bedside. Laboratory and radiographic data reviewed.

## 2021-03-02 NOTE — PROGRESS NOTE ADULT - ASSESSMENT
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 HSV PCR negative.  Sweet syndrome, or acute febrile neutrophilic dermatosis, is an inflammatory disorder manifesting as multiple sterile, painful, edematous, erythematous plaques that are usually associated with fever and leukocytosis. The disease is typically skin-limited, although any organ system may also be affected. Other sites that may be affected include the oral mucosa, gastrointestinal tract, musculoskeletal system, lungs, kidneys, heart, and central nervous system (CNS). It may be seen in patients of all ages, but it is most common in women aged 20-60 and individuals with inflammatory bowel disease (IBD) or hematologic malignancies (especially myeloid leukemias and myelodysplastic syndrome). Although the exact etiology is still unclear, abnormal cytokine expression and atypical neutrophil function are thought to contribute to the pathogenesis. A genetic predisposition may also be operable.   - Maintain low threshold for pulmonary imaging should patient develop any respiratory symptoms  - C/w clobetasol 0.05% ointment BID to affected areas for up to 2 weeks on, then 1 week off.  - INCOMPLETE    The patient's chart was reviewed in addition to being seen and examined at bedside with the dermatology attending Dr. Negrita Haque. 1) Favor Sweet Syndrome  - H&E potentially suggestive of neutrophilic dermatosis such as Sweet's syndrome or pyoderma gangrenosum.  - Tissue cultures all negative to date. HSV PCR negative.  Sweet syndrome, or acute febrile neutrophilic dermatosis, is an inflammatory disorder manifesting as multiple sterile, painful, edematous, erythematous plaques that are usually associated with fever and leukocytosis. The disease is typically skin-limited, although any organ system may also be affected. Other sites that may be affected include the oral mucosa, gastrointestinal tract, musculoskeletal system, lungs, kidneys, heart, and central nervous system (CNS). It may be seen in patients of all ages, but it is most common in women aged 20-60 and individuals with inflammatory bowel disease (IBD) or hematologic malignancies (especially myeloid leukemias and myelodysplastic syndrome). Although the exact etiology is still unclear, abnormal cytokine expression and atypical neutrophil function are thought to contribute to the pathogenesis. A genetic predisposition may also be operable.   - Maintain low threshold for pulmonary imaging should patient develop any respiratory symptoms  - C/w clobetasol 0.05% ointment BID to affected areas for up to 2 weeks on, then 1 week off.  - start methylprednisolone 60 mg IV once now, and give 2nd dose of 60 mg IV on 3/3 in morning. Will likely send patient home on prednisone 40 mg daily with close follow-up.  - start calcium 1200 mg daily and vitamin D 1000 IU daily for prophylaxis.    The patient's chart was reviewed in addition to being seen and examined at bedside with the dermatology attending Dr. Negrita Haque. 1) Favor Sweet Syndrome, infectious workup is ongoing.   - H&E potentially suggestive of neutrophilic dermatosis such as Sweet's syndrome or pyoderma gangrenosum, however organismal stains are pending and infection has not been definitively ruled out  - Although infection has not been definitively ruled out with biopsy stains pending, strong clinical suspicion for a neutrophilic dermatoses. Low risk in treating with a few doses of IV steroids to monitor for clinical improvement especially as she remains on broad antimicrobial, antiviral and antifungal coverage.   - Tissue cultures all negative to date. HSV PCR negative.  Sweet syndrome, or acute febrile neutrophilic dermatosis, is an inflammatory disorder manifesting as multiple sterile, painful, edematous, erythematous plaques that are usually associated with fever and leukocytosis. The disease is typically skin-limited, although any organ system may also be affected. Other sites that may be affected include the oral mucosa, gastrointestinal tract, musculoskeletal system, lungs, kidneys, heart, and central nervous system (CNS). It may be seen in patients of all ages, but it is most common in women aged 20-60 and individuals with inflammatory bowel disease (IBD) or hematologic malignancies (especially myeloid leukemias and myelodysplastic syndrome). Although the exact etiology is still unclear, abnormal cytokine expression and atypical neutrophil function are thought to contribute to the pathogenesis. A genetic predisposition may also be operable.   - Maintain low threshold for pulmonary imaging should patient develop any respiratory symptoms  - C/w clobetasol 0.05% ointment BID to affected areas for up to 2 weeks on, then 1 week off.  - start methylprednisolone 60 mg IV once now, and give 2nd dose of 60 mg IV on 3/3 in morning. We will reassess tomorrow afternoon.   - Pending organismal stains on biopsy (assuming negative) Will likely send patient home on prednisone 40 mg daily with close follow-up.   - start calcium 1200 mg daily and vitamin D 1000 IU daily for prophylaxis.    The patient's chart was reviewed in addition to being seen and examined at bedside with the dermatology attending Dr. Negrita Haque.

## 2021-03-02 NOTE — PROGRESS NOTE ADULT - ASSESSMENT
81 yo F w/ AML, CLL, hx L breast lumpectomy p/w R 3rd digit skin lesion x 1 week. Pt reported that she noticed a skin lesion on her right middle finger about 1 week ago, which has worsened over time. Pt described the pain in the lesion as throbbing intermittent, worse when pressure is applied.     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 Derm followed up today and treatment plan consists of topical steroid ointment: clobetasol  - hemodynamically stable,   -c/w broad spectrum antibiotics , ID on board  -pancytopenia 2/2 chemotherapy. neulasta will not be administered since ptn has h/o AML.   -ANC <500  -continue ppx medications, acyclovir, voriconazole   -f/u cultures   -CBC with diff daily   - "blistering lesions"  c/w SWEET SYNDROME as per derm.   - wbc slowly rising, still neutropenic, HH improved post PRBC on 3/1.   - ptn also c/o painful hard subcutaneous mass over left buttock, had it on admission, but forgot to mention. will get CT w contrast. is it an addition lesion which is part of SWEET SYNDROME?     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

## 2021-03-02 NOTE — PROGRESS NOTE ADULT - SUBJECTIVE AND OBJECTIVE BOX
Patient is a 80y old  Female who presents with a chief complaint of neutropenic sepsis (02 Mar 2021 16:09)      SUBJECTIVE / OVERNIGHT EVENTS: wbc slowly rising, still neutropenic, HH improved post PRBC on 3/1. ptn c/o pain to vesicular skin lesions, ptn also c/o painful hard subcutaneous mass over left buttock, had it on admission, but forgot to mention. . all Cx NTD    MEDICATIONS  (STANDING):  acyclovir   Oral Tab/Cap 400 milliGRAM(s) Oral two times a day  cefepime   IVPB      cefepime   IVPB 1000 milliGRAM(s) IV Intermittent every 8 hours  clobetasol 0.05% Ointment 1 Application(s) Topical two times a day  pantoprazole    Tablet 40 milliGRAM(s) Oral before breakfast  polyethylene glycol 3350 17 Gram(s) Oral daily  posaconazole DR Tablet 300 milliGRAM(s) Oral daily  senna 2 Tablet(s) Oral at bedtime  sodium chloride 0.45%. 1000 milliLiter(s) (60 mL/Hr) IV Continuous <Continuous>  vancomycin  IVPB      vancomycin  IVPB 1000 milliGRAM(s) IV Intermittent every 12 hours    MEDICATIONS  (PRN):  acetaminophen   Tablet .. 650 milliGRAM(s) Oral every 6 hours PRN Temp greater or equal to 38C (100.4F), Mild Pain (1 - 3)  oxycodone    5 mG/acetaminophen 325 mG 1 Tablet(s) Oral every 6 hours PRN Severe Pain (7 - 10)  traMADol 50 milliGRAM(s) Oral every 8 hours PRN Moderate Pain (4 - 6)      Vital Signs Last 24 Hrs  T(F): 99.4 (03-02-21 @ 16:20), Max: 100 (03-01-21 @ 23:40)  HR: 92 (03-02-21 @ 16:20) (84 - 103)  BP: 120/69 (03-02-21 @ 16:20) (120/69 - 154/88)  RR: 18 (03-02-21 @ 16:20) (18 - 18)  SpO2: 94% (03-02-21 @ 16:20) (94% - 98%)  Telemetry:   CAPILLARY BLOOD GLUCOSE        I&O's Summary    01 Mar 2021 07:01  -  02 Mar 2021 07:00  --------------------------------------------------------  IN: 1310 mL / OUT: 0 mL / NET: 1310 mL    02 Mar 2021 07:01  -  02 Mar 2021 17:14  --------------------------------------------------------  IN: 600 mL / OUT: 0 mL / NET: 600 mL        PHYSICAL EXAM:  GENERAL: NAD, well-developed  HEAD:  Atraumatic, Normocephalic  EYES: EOMI, PERRLA, conjunctiva and sclera clear  NECK: Supple, No JVD  CHEST/LUNG: Clear to auscultation bilaterally; No wheeze  HEART: Regular rate and rhythm; No murmurs, rubs, or gallops  ABDOMEN: Soft, Nontender, Nondistended; Bowel sounds present  EXTREMITIES:  2+ Peripheral Pulses, No clubbing, cyanosis, or edema  PSYCH: AAOx3  NEUROLOGY: non-focal  SKIN: No rashes or lesions    LABS:                        7.7    0.76  )-----------( 143      ( 02 Mar 2021 07:14 )             23.0     03-01    138  |  102  |  8   ----------------------------<  105<H>  3.8   |  23  |  0.63    Ca    8.8      01 Mar 2021 07:12                RADIOLOGY & ADDITIONAL TESTS:    Imaging Personally Reviewed:    Consultant(s) Notes Reviewed:      Care Discussed with Consultants/Other Providers:

## 2021-03-02 NOTE — PROGRESS NOTE ADULT - SUBJECTIVE AND OBJECTIVE BOX
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  Infectious w/u neg.     ROS:  CONSTITUTIONAL:  No fever, chills, rigors  CARDIOVASCULAR:  No chest pain or palpitations  RESPIRATORY:   No SOB, cough, dyspnea on exertion.  No wheezing  GASTROINTESTINAL:  No abd pain, N/V, diarrhea/constipation  EXTREMITIES:  No swelling or joint pain  GENITOURINARY:  No burning on urination, increased frequency or urgency.  No flank pain  NEUROLOGIC:  No HA, visual disturbances  SKIN: No rashes    Allergies    Bactrim (Rash)  dapsone (Other)  doxycycline (Rash)  sulfa drugs (Hives; Rash)    Intolerances        ANTIBIOTICS/RELEVANT:  antimicrobials  acyclovir   Oral Tab/Cap 400 milliGRAM(s) Oral two times a day  cefepime   IVPB      cefepime   IVPB 1000 milliGRAM(s) IV Intermittent every 8 hours  posaconazole DR Tablet 300 milliGRAM(s) Oral daily  vancomycin  IVPB      vancomycin  IVPB 1000 milliGRAM(s) IV Intermittent every 12 hours    immunologic:    OTHER:  acetaminophen   Tablet .. 650 milliGRAM(s) Oral every 6 hours PRN  bisacodyl Suppository 10 milliGRAM(s) Rectal every 24 hours PRN  clobetasol 0.05% Ointment 1 Application(s) Topical two times a day  oxycodone    5 mG/acetaminophen 325 mG 1 Tablet(s) Oral every 6 hours PRN  pantoprazole    Tablet 40 milliGRAM(s) Oral before breakfast  polyethylene glycol 3350 17 Gram(s) Oral daily  saline laxative (FLEET) Rectal Enema 1 Enema Rectal once  senna 2 Tablet(s) Oral at bedtime  sodium chloride 0.45%. 1000 milliLiter(s) IV Continuous <Continuous>  traMADol 50 milliGRAM(s) Oral every 8 hours PRN      Objective:  Vital Signs Last 24 Hrs  T(C): 37.4 (02 Mar 2021 16:20), Max: 37.8 (01 Mar 2021 23:40)  T(F): 99.4 (02 Mar 2021 16:20), Max: 100 (01 Mar 2021 23:40)  HR: 92 (02 Mar 2021 16:20) (84 - 103)  BP: 120/69 (02 Mar 2021 16:20) (120/69 - 154/88)  BP(mean): --  RR: 18 (02 Mar 2021 16:20) (18 - 18)  SpO2: 94% (02 Mar 2021 16:20) (94% - 98%)    PHYSICAL EXAM:  Constitutional:NAD  Eyes:JUSTINO, EOMI  Ear/Nose/Throat: no thrush, mucositis.  Moist mucous membranes	  Neck:no JVD, no lymphadenopathy, supple  Respiratory: CTA torrie  Cardiovascular: S1S2 RRR, no murmurs  Gastrointestinal:soft, nontender,  nondistended (+) BS  Extremities:no e/e/c  Skin: nodular skin lesions over rt hand 3rd digit, rt dorsum.         LABS:                        7.7    0.76  )-----------( 143      ( 02 Mar 2021 07:14 )             23.0     03-01    138  |  102  |  8   ----------------------------<  105<H>  3.8   |  23  |  0.63    Ca    8.8      01 Mar 2021 07:12                  Vancomycin Level, Trough: 9.4 ug/mL (03-01 @ 07:12)              MICROBIOLOGY:    Babesia microti PCR, Blood. (03.01.21 @ 08:52)   Culture Results:   Babesia microti PCR   Results: NOT detected     Culture - Fungal, Tissue (02.28.21 @ 04:39)   Specimen Source: .Tissue right foot   Culture Results:   Testing in progress     Culture - Tissue with Gram Stain (02.28.21 @ 04:39)   Gram Stain:   No polymorphonuclear leukocytes seen per low power field   No organisms seen per oil power field   Specimen Source: .Tissue Other   Culture Results:   No growth to date.     RADIOLOGY & ADDITIONAL STUDIES:

## 2021-03-02 NOTE — PROGRESS NOTE ADULT - SUBJECTIVE AND OBJECTIVE BOX
INTERVAL HPI/OVERNIGHT EVENTS:    f/u rash    - clobetasol ointment started 3/1, lesions mostly unchanged.    MEDICATIONS  (STANDING):  acyclovir   Oral Tab/Cap 400 milliGRAM(s) Oral two times a day  cefepime   IVPB      cefepime   IVPB 1000 milliGRAM(s) IV Intermittent every 8 hours  clobetasol 0.05% Ointment 1 Application(s) Topical two times a day  pantoprazole    Tablet 40 milliGRAM(s) Oral before breakfast  polyethylene glycol 3350 17 Gram(s) Oral daily  posaconazole DR Tablet 300 milliGRAM(s) Oral daily  senna 2 Tablet(s) Oral at bedtime  sodium chloride 0.45%. 1000 milliLiter(s) (60 mL/Hr) IV Continuous <Continuous>  vancomycin  IVPB      vancomycin  IVPB 1000 milliGRAM(s) IV Intermittent every 12 hours    MEDICATIONS  (PRN):  acetaminophen   Tablet .. 650 milliGRAM(s) Oral every 6 hours PRN Temp greater or equal to 38C (100.4F), Mild Pain (1 - 3)  oxycodone    5 mG/acetaminophen 325 mG 1 Tablet(s) Oral every 6 hours PRN Severe Pain (7 - 10)  traMADol 50 milliGRAM(s) Oral every 8 hours PRN Moderate Pain (4 - 6)      Allergies    Bactrim (Rash)  dapsone (Other)  doxycycline (Rash)  sulfa drugs (Hives; Rash)    Intolerances        REVIEW OF SYSTEMS      General: no fevers/chills, no NS	    Skin: see HPI  	  Ophthalmologic: no eye pain or change in vision    Genitourinary: no dysuria or hematuria    Musculoskeletal: no joint pains or weakness	    Neurological:no weakness or tingling          Vital Signs Last 24 Hrs  T(C): 36.7 (02 Mar 2021 07:59), Max: 37.8 (01 Mar 2021 23:40)  T(F): 98.1 (02 Mar 2021 07:59), Max: 100 (01 Mar 2021 23:40)  HR: 89 (02 Mar 2021 07:59) (84 - 103)  BP: 120/74 (02 Mar 2021 07:59) (120/74 - 154/88)  BP(mean): --  RR: 18 (02 Mar 2021 07:59) (18 - 18)  SpO2: 97% (02 Mar 2021 07:59) (96% - 98%)    PHYSICAL EXAM:   The patient was lying in bed and well nourished.  There was no visible lymphadenopathy.  Conjunctiva were non injected.  There was no clubbing of extremities.  There was no hyperhidrosis or bromhidrosis.    Of note on skin exam:   Right distal third digit with gray-purple ulcerated tender nodule on erythematous base  Left dorsal foot with gray-purple nodule on erythematous base  Left index finger with pink papule on lateral side  violaceous papule on tip of left third middle finger    LABS:                        7.7    0.76  )-----------( 143      ( 02 Mar 2021 07:14 )             23.0     03-01    138  |  102  |  8   ----------------------------<  105<H>  3.8   |  23  |  0.63    Ca    8.8      01 Mar 2021 07:12           INTERVAL HPI/OVERNIGHT EVENTS:    f/u rash    - clobetasol ointment started 3/1, lesions mostly unchanged.    MEDICATIONS  (STANDING):  acyclovir   Oral Tab/Cap 400 milliGRAM(s) Oral two times a day  cefepime   IVPB      cefepime   IVPB 1000 milliGRAM(s) IV Intermittent every 8 hours  clobetasol 0.05% Ointment 1 Application(s) Topical two times a day  pantoprazole    Tablet 40 milliGRAM(s) Oral before breakfast  polyethylene glycol 3350 17 Gram(s) Oral daily  posaconazole DR Tablet 300 milliGRAM(s) Oral daily  senna 2 Tablet(s) Oral at bedtime  sodium chloride 0.45%. 1000 milliLiter(s) (60 mL/Hr) IV Continuous <Continuous>  vancomycin  IVPB      vancomycin  IVPB 1000 milliGRAM(s) IV Intermittent every 12 hours    MEDICATIONS  (PRN):  acetaminophen   Tablet .. 650 milliGRAM(s) Oral every 6 hours PRN Temp greater or equal to 38C (100.4F), Mild Pain (1 - 3)  oxycodone    5 mG/acetaminophen 325 mG 1 Tablet(s) Oral every 6 hours PRN Severe Pain (7 - 10)  traMADol 50 milliGRAM(s) Oral every 8 hours PRN Moderate Pain (4 - 6)      Allergies    Bactrim (Rash)  dapsone (Other)  doxycycline (Rash)  sulfa drugs (Hives; Rash)    Intolerances        REVIEW OF SYSTEMS      General: no fevers/chills, no NS	    Skin: see HPI  	  Ophthalmologic: no eye pain or change in vision    Genitourinary: no dysuria or hematuria    Musculoskeletal: no joint pains or weakness	    Neurological:no weakness or tingling          Vital Signs Last 24 Hrs  T(C): 36.7 (02 Mar 2021 07:59), Max: 37.8 (01 Mar 2021 23:40)  T(F): 98.1 (02 Mar 2021 07:59), Max: 100 (01 Mar 2021 23:40)  HR: 89 (02 Mar 2021 07:59) (84 - 103)  BP: 120/74 (02 Mar 2021 07:59) (120/74 - 154/88)  BP(mean): --  RR: 18 (02 Mar 2021 07:59) (18 - 18)  SpO2: 97% (02 Mar 2021 07:59) (96% - 98%)    PHYSICAL EXAM:   The patient was lying in bed and well nourished.  There was no visible lymphadenopathy.  Conjunctiva were non injected.  There was no clubbing of extremities.  There was no hyperhidrosis or bromhidrosis.    Of note on skin exam:   Right distal third digit with gray-purple ulcerated tender nodule on erythematous base  R index finger with 2 violaceous papules  Left dorsal foot with gray-purple nodule on erythematous base  Left index finger with pink papule on lateral side  Left 4th finger with violaceous papule  violaceous papule on tip of left third middle finger  2 pink papules on back    LABS:                        7.7    0.76  )-----------( 143      ( 02 Mar 2021 07:14 )             23.0     03-01    138  |  102  |  8   ----------------------------<  105<H>  3.8   |  23  |  0.63    Ca    8.8      01 Mar 2021 07:12           INTERVAL HPI/OVERNIGHT EVENTS:    f/u rash    - clobetasol ointment started 3/1, lesions mostly unchanged and few new lesions as well.    MEDICATIONS  (STANDING):  acyclovir   Oral Tab/Cap 400 milliGRAM(s) Oral two times a day  cefepime   IVPB      cefepime   IVPB 1000 milliGRAM(s) IV Intermittent every 8 hours  clobetasol 0.05% Ointment 1 Application(s) Topical two times a day  pantoprazole    Tablet 40 milliGRAM(s) Oral before breakfast  polyethylene glycol 3350 17 Gram(s) Oral daily  posaconazole DR Tablet 300 milliGRAM(s) Oral daily  senna 2 Tablet(s) Oral at bedtime  sodium chloride 0.45%. 1000 milliLiter(s) (60 mL/Hr) IV Continuous <Continuous>  vancomycin  IVPB      vancomycin  IVPB 1000 milliGRAM(s) IV Intermittent every 12 hours    MEDICATIONS  (PRN):  acetaminophen   Tablet .. 650 milliGRAM(s) Oral every 6 hours PRN Temp greater or equal to 38C (100.4F), Mild Pain (1 - 3)  oxycodone    5 mG/acetaminophen 325 mG 1 Tablet(s) Oral every 6 hours PRN Severe Pain (7 - 10)  traMADol 50 milliGRAM(s) Oral every 8 hours PRN Moderate Pain (4 - 6)      Allergies    Bactrim (Rash)  dapsone (Other)  doxycycline (Rash)  sulfa drugs (Hives; Rash)    Intolerances        REVIEW OF SYSTEMS      General: no fevers/chills, no NS	    Skin: see HPI  	  Ophthalmologic: no eye pain or change in vision    Genitourinary: no dysuria or hematuria    Musculoskeletal: no joint pains or weakness	    Neurological:no weakness or tingling          Vital Signs Last 24 Hrs  T(C): 36.7 (02 Mar 2021 07:59), Max: 37.8 (01 Mar 2021 23:40)  T(F): 98.1 (02 Mar 2021 07:59), Max: 100 (01 Mar 2021 23:40)  HR: 89 (02 Mar 2021 07:59) (84 - 103)  BP: 120/74 (02 Mar 2021 07:59) (120/74 - 154/88)  BP(mean): --  RR: 18 (02 Mar 2021 07:59) (18 - 18)  SpO2: 97% (02 Mar 2021 07:59) (96% - 98%)    PHYSICAL EXAM:   The patient was lying in bed and well nourished.  There was no visible lymphadenopathy.  Conjunctiva were non injected.  There was no clubbing of extremities.  There was no hyperhidrosis or bromhidrosis.    Of note on skin exam:   Right distal third digit with gray-purple ulcerated tender nodule on erythematous base  R index finger with 2 violaceous papules  Left dorsal foot with gray-purple nodule on erythematous base  Left index finger with pink papule on lateral side  Left 4th finger with violaceous papule  violaceous papule on tip of left third middle finger  2 pink papules on back    LABS:                        7.7    0.76  )-----------( 143      ( 02 Mar 2021 07:14 )             23.0     03-01    138  |  102  |  8   ----------------------------<  105<H>  3.8   |  23  |  0.63    Ca    8.8      01 Mar 2021 07:12

## 2021-03-02 NOTE — CONSULT NOTE ADULT - SUBJECTIVE AND OBJECTIVE BOX
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  She had not had a BM since coming to the hospital, but today passed some pellet like stool w straining.  She has had some blood in the stool at home. She takes prune juice at home.  Her last colonoscopy was in 2019.  The patient has been on narcotic pain medication.  The patient denies dysphagia, nausea and vomiting, abdominal pain, diarrhea, unintentional weight loss, change in bowel habits or NSAID use.      Allergies:  Bactrim (Rash)  dapsone (Other)  doxycycline (Rash)  sulfa drugs (Hives; Rash)      Home Medications:    Hospital Medications:  acetaminophen   Tablet .. 650 milliGRAM(s) Oral every 6 hours PRN  acyclovir   Oral Tab/Cap 400 milliGRAM(s) Oral two times a day  calcium carbonate   1250 mG (OsCal) 1 Tablet(s) Oral daily  cefepime   IVPB      cefepime   IVPB 1000 milliGRAM(s) IV Intermittent every 8 hours  cholecalciferol 1000 Unit(s) Oral daily  clobetasol 0.05% Ointment 1 Application(s) Topical two times a day  oxycodone    5 mG/acetaminophen 325 mG 1 Tablet(s) Oral every 6 hours PRN  pantoprazole    Tablet 40 milliGRAM(s) Oral before breakfast  polyethylene glycol 3350 17 Gram(s) Oral two times a day  posaconazole DR Tablet 300 milliGRAM(s) Oral daily  saline laxative (FLEET) Rectal Enema 1 Enema Rectal once  senna 2 Tablet(s) Oral at bedtime  sodium chloride 0.45%. 1000 milliLiter(s) IV Continuous <Continuous>  traMADol 50 milliGRAM(s) Oral every 8 hours PRN  vancomycin  IVPB      vancomycin  IVPB 1000 milliGRAM(s) IV Intermittent every 12 hours      PMHX/PSHX:      Family history:      Social History:   Denies ethanol use.  Denies illicit drug use.    ROS:     General:  No wt loss, fevers, chills, night sweats, fatigue,   Eyes:  Good vision, no reported pain  ENT:  No sore throat, pain, runny nose, dysphagia  CV:  No pain, palpitations, hypo/hypertension  Resp:  No dyspnea, cough, tachypnea, wheezing  GI:  See HPI  :  No pain, bleeding, incontinence, nocturia  Muscle:  No pain, weakness  Neuro:  No weakness, tingling, memory problems  Psych:  No fatigue, insomnia, mood problems, depression  Endocrine:  No polyuria, polydipsia, cold/heat intolerance  Heme:  No petechiae, ecchymosis, easy bruisability  Integumentary:  No rash, edema      PHYSICAL EXAM:     GENERAL:  Appears stated age, well-groomed, well-nourished, no distress  HEENT:  NC/AT,  conjunctivae anicteric, clear and pink,   NECK: supple, trachea midline  CHEST:  Full & symmetric excursion, no increased effort, breath sounds clear  HEART:  Regular rhythm, no JVD  ABDOMEN:  Soft, non-tender, non-distended, normoactive bowel sounds,  no masses , no hepatosplenomegaly  EXTREMITIES:  no cyanosis,clubbing or edema  SKIN:  No rash, +digital lesion  NEURO:  Alert, non-focal, no asterixis  PSYCH: Appropriate affect, oriented to place and time  RECTAL: Deferred      Vital Signs:  Vital Signs Last 24 Hrs  T(C): 37.4 (02 Mar 2021 16:20), Max: 37.8 (01 Mar 2021 23:40)  T(F): 99.4 (02 Mar 2021 16:20), Max: 100 (01 Mar 2021 23:40)  HR: 92 (02 Mar 2021 16:20) (84 - 96)  BP: 120/69 (02 Mar 2021 16:20) (120/69 - 154/88)  BP(mean): --  RR: 18 (02 Mar 2021 16:20) (18 - 18)  SpO2: 94% (02 Mar 2021 16:20) (94% - 98%)  Daily     Daily     LABS:                        7.7    0.76  )-----------( 143      ( 02 Mar 2021 07:14 )             23.0     03-01    138  |  102  |  8   ----------------------------<  105<H>  3.8   |  23  |  0.63    Ca    8.8      01 Mar 2021 07:12

## 2021-03-02 NOTE — PROGRESS NOTE ADULT - ASSESSMENT
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 Pt reported that she noticed a skin lesion on her right middle finger about 1 week ago, which has worsened over time. Pt described the pain in the lesion as throbbing intermittent, worse when pressure is applied. Pt recently stopped taking levaquin and was switched to cephalexin by her oncologist Dr. Hall. In addition to the cephalexin, she takes acyclovir, and voriconazole. Pt reported that she had worsening pain in the lesion which prompted her to visit [facility], where it was unable to be drained. Her doctor suggested she come to [facility] for dermatologic expertise. In addition to the lesion in her R 3rd digit, she has noticed the starting of new similar lesions on her b/l hands and L foot. Pt says she had a temp of 100.3 and 99.9F earlier in the week, and has intermittent night sweats over the last two weeks. (27 Feb 2021 21:31)    Pt was also treated for thrush recently, states while on chemotherapy WBC dropped to 0.4.  Developed a left tongue lateral lesion which has now improved.     ER vitals:  T 97.8, P 110, /78.  WBC 0.86.  Rt hand xray no cortical erosions, (+) soft tissue prominence of PIP/DIP.  Pt seen by Derm, noted to have right distal third digit with gray-purple ulcerated tender nodule on erythematous base, left dorsal foot with gray-purple nodule on erythematous base, left index finger with pink papule on lateral side and skin colored papule on tip of left third middle finger.   Pt s/p punch biopsies performed for H&E as well as tissue culture to help distinguish between Sweet Syndrome vs infectious etiology.  Pt started on vanco/cefepime.       Neutropenic fever:    - Pt with low grade fevers at home, (+) leukopenia.  Pt on chemotherapy for AML.  Cont vanco/cefepime for now (cover for MRSA and pseudomonas).  Pt with several skin lesions, s/p recent attempted I&D of rt hand middle finger at [facility]    - Pt on acyclovir and posaconazole for additional prophylaxis against HSV/VZV and fungal infection.      - Check blood cx x 2 - ngtd    - Monitor Cbc with diff.    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  Send ESR, CRP (noted elevation)    - r/o infectious causes such as fungal, mycobacterial, bacterial sepsis.  f/u blood cx, wound cx (neg thus far)    - Check HIV (-), EBV/CMV, RPR, parvovirus, erhrlichia/babesia (- pcr)/Lyme (-).  Pt denies recent travel, bug or tick bites, sick contacts.       * Infectious w/u neg.  No evidence for MRSA infection, will d/c vanco.  Cont cefepime for now due to neutropenia.  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     Will follow,    No Jackson  [phone]

## 2021-03-03 LAB
A PHAGOCYTOPH IGG TITR SER IF: NEGATIVE — SIGNIFICANT CHANGE UP
A PHAGOCYTOPH IGM TITR SER IF: NEGATIVE — SIGNIFICANT CHANGE UP
ANISOCYTOSIS BLD QL: SLIGHT — SIGNIFICANT CHANGE UP
B MICROTI IGG TITR SER: SIGNIFICANT CHANGE UP
B MICROTI IGM TITR SER: SIGNIFICANT CHANGE UP
BASOPHILS # BLD AUTO: 0.01 K/UL — SIGNIFICANT CHANGE UP (ref 0–0.2)
BASOPHILS NFR BLD AUTO: 0.9 % — SIGNIFICANT CHANGE UP (ref 0–2)
DACRYOCYTES BLD QL SMEAR: SLIGHT — SIGNIFICANT CHANGE UP
E CHAFFEENSIS IGG TITR SER: NEGATIVE — SIGNIFICANT CHANGE UP
E CHAFFEENSIS IGM TITR SER IF: NEGATIVE — SIGNIFICANT CHANGE UP
ELLIPTOCYTES BLD QL SMEAR: SLIGHT — SIGNIFICANT CHANGE UP
EOSINOPHIL # BLD AUTO: 0 K/UL — SIGNIFICANT CHANGE UP (ref 0–0.5)
EOSINOPHIL NFR BLD AUTO: 0 % — SIGNIFICANT CHANGE UP (ref 0–6)
HCT VFR BLD CALC: 28 % — LOW (ref 34.5–45)
HGB BLD-MCNC: 9.4 G/DL — LOW (ref 11.5–15.5)
HYPOCHROMIA BLD QL: SLIGHT — SIGNIFICANT CHANGE UP
LYMPHOCYTES # BLD AUTO: 0.11 K/UL — LOW (ref 1–3.3)
LYMPHOCYTES # BLD AUTO: 15 % — SIGNIFICANT CHANGE UP (ref 13–44)
MANUAL SMEAR VERIFICATION: SIGNIFICANT CHANGE UP
MCHC RBC-ENTMCNC: 32.4 PG — SIGNIFICANT CHANGE UP (ref 27–34)
MCHC RBC-ENTMCNC: 33.6 GM/DL — SIGNIFICANT CHANGE UP (ref 32–36)
MCV RBC AUTO: 96.6 FL — SIGNIFICANT CHANGE UP (ref 80–100)
MONOCYTES # BLD AUTO: 0 K/UL — SIGNIFICANT CHANGE UP (ref 0–0.9)
MONOCYTES NFR BLD AUTO: 0 % — LOW (ref 2–14)
NEUTROPHILS # BLD AUTO: 0.61 K/UL — LOW (ref 1.8–7.4)
NEUTROPHILS NFR BLD AUTO: 81.4 % — HIGH (ref 43–77)
NEUTS BAND # BLD: 2.7 % — SIGNIFICANT CHANGE UP (ref 0–8)
OVALOCYTES BLD QL SMEAR: SLIGHT — SIGNIFICANT CHANGE UP
PLAT MORPH BLD: NORMAL — SIGNIFICANT CHANGE UP
PLATELET # BLD AUTO: 162 K/UL — SIGNIFICANT CHANGE UP (ref 150–400)
POIKILOCYTOSIS BLD QL AUTO: SLIGHT — SIGNIFICANT CHANGE UP
POLYCHROMASIA BLD QL SMEAR: SLIGHT — SIGNIFICANT CHANGE UP
RBC # BLD: 2.9 M/UL — LOW (ref 3.8–5.2)
RBC # FLD: 16.4 % — HIGH (ref 10.3–14.5)
RBC BLD AUTO: ABNORMAL
SCHISTOCYTES BLD QL AUTO: SLIGHT — SIGNIFICANT CHANGE UP
TOXIC GRANULES BLD QL SMEAR: PRESENT — SIGNIFICANT CHANGE UP
VANCOMYCIN TROUGH SERPL-MCNC: 11.6 UG/ML — SIGNIFICANT CHANGE UP (ref 10–20)
WBC # BLD: 0.72 K/UL — CRITICAL LOW (ref 3.8–10.5)
WBC # FLD AUTO: 0.72 K/UL — CRITICAL LOW (ref 3.8–10.5)

## 2021-03-03 PROCEDURE — 99233 SBSQ HOSP IP/OBS HIGH 50: CPT

## 2021-03-03 RX ORDER — MULTIVIT WITH MIN/MFOLATE/K2 340-15/3 G
1 POWDER (GRAM) ORAL ONCE
Refills: 0 | Status: COMPLETED | OUTPATIENT
Start: 2021-03-03 | End: 2021-03-03

## 2021-03-03 RX ADMIN — OXYCODONE AND ACETAMINOPHEN 1 TABLET(S): 5; 325 TABLET ORAL at 06:27

## 2021-03-03 RX ADMIN — Medication 250 MILLIGRAM(S): at 18:17

## 2021-03-03 RX ADMIN — OXYCODONE AND ACETAMINOPHEN 1 TABLET(S): 5; 325 TABLET ORAL at 23:05

## 2021-03-03 RX ADMIN — Medication 1 APPLICATION(S): at 17:36

## 2021-03-03 RX ADMIN — SENNA PLUS 2 TABLET(S): 8.6 TABLET ORAL at 22:24

## 2021-03-03 RX ADMIN — Medication 1000 UNIT(S): at 11:26

## 2021-03-03 RX ADMIN — CEFEPIME 100 MILLIGRAM(S): 1 INJECTION, POWDER, FOR SOLUTION INTRAMUSCULAR; INTRAVENOUS at 14:21

## 2021-03-03 RX ADMIN — CEFEPIME 100 MILLIGRAM(S): 1 INJECTION, POWDER, FOR SOLUTION INTRAMUSCULAR; INTRAVENOUS at 22:24

## 2021-03-03 RX ADMIN — Medication 400 MILLIGRAM(S): at 06:27

## 2021-03-03 RX ADMIN — POLYETHYLENE GLYCOL 3350 17 GRAM(S): 17 POWDER, FOR SOLUTION ORAL at 06:27

## 2021-03-03 RX ADMIN — Medication 1 BOTTLE: at 14:37

## 2021-03-03 RX ADMIN — POSACONAZOLE 300 MILLIGRAM(S): 100 TABLET, DELAYED RELEASE ORAL at 11:26

## 2021-03-03 RX ADMIN — Medication 400 MILLIGRAM(S): at 17:35

## 2021-03-03 RX ADMIN — PANTOPRAZOLE SODIUM 40 MILLIGRAM(S): 20 TABLET, DELAYED RELEASE ORAL at 06:27

## 2021-03-03 RX ADMIN — Medication 1 APPLICATION(S): at 06:27

## 2021-03-03 RX ADMIN — Medication 250 MILLIGRAM(S): at 07:18

## 2021-03-03 RX ADMIN — CEFEPIME 100 MILLIGRAM(S): 1 INJECTION, POWDER, FOR SOLUTION INTRAMUSCULAR; INTRAVENOUS at 06:27

## 2021-03-03 RX ADMIN — Medication 60 MILLIGRAM(S): at 06:27

## 2021-03-03 RX ADMIN — OXYCODONE AND ACETAMINOPHEN 1 TABLET(S): 5; 325 TABLET ORAL at 22:24

## 2021-03-03 RX ADMIN — Medication 1 TABLET(S): at 11:26

## 2021-03-03 NOTE — PROGRESS NOTE ADULT - SUBJECTIVE AND OBJECTIVE BOX
Patient is a 80y old  Female who presents with a chief complaint of neutropenic sepsis (03 Mar 2021 16:13)      SUBJECTIVE / OVERNIGHT EVENTS: had ct pf pelvis, revealing a left gluteal mass, recommending MR w contrast to r/o mass vs hematoma    MEDICATIONS  (STANDING):  acyclovir   Oral Tab/Cap 400 milliGRAM(s) Oral two times a day  calcium carbonate   1250 mG (OsCal) 1 Tablet(s) Oral daily  cefepime   IVPB      cefepime   IVPB 1000 milliGRAM(s) IV Intermittent every 8 hours  cholecalciferol 1000 Unit(s) Oral daily  clobetasol 0.05% Ointment 1 Application(s) Topical two times a day  pantoprazole    Tablet 40 milliGRAM(s) Oral before breakfast  polyethylene glycol 3350 17 Gram(s) Oral two times a day  posaconazole DR Tablet 300 milliGRAM(s) Oral daily  senna 2 Tablet(s) Oral at bedtime  sodium chloride 0.45%. 1000 milliLiter(s) (60 mL/Hr) IV Continuous <Continuous>  vancomycin  IVPB      vancomycin  IVPB 1000 milliGRAM(s) IV Intermittent every 12 hours    MEDICATIONS  (PRN):  acetaminophen   Tablet .. 650 milliGRAM(s) Oral every 6 hours PRN Temp greater or equal to 38C (100.4F), Mild Pain (1 - 3)  oxycodone    5 mG/acetaminophen 325 mG 1 Tablet(s) Oral every 6 hours PRN Severe Pain (7 - 10)  traMADol 50 milliGRAM(s) Oral every 8 hours PRN Moderate Pain (4 - 6)      Vital Signs Last 24 Hrs  T(F): 97.4 (03-03-21 @ 20:04), Max: 98.2 (03-02-21 @ 23:30)  HR: 79 (03-03-21 @ 20:04) (66 - 87)  BP: 143/78 (03-03-21 @ 20:04) (110/63 - 154/83)  RR: 18 (03-03-21 @ 20:04) (18 - 18)  SpO2: 94% (03-03-21 @ 20:04) (94% - 97%)  Telemetry:   CAPILLARY BLOOD GLUCOSE        I&O's Summary    02 Mar 2021 07:01  -  03 Mar 2021 07:00  --------------------------------------------------------  IN: 650 mL / OUT: 1 mL / NET: 649 mL    03 Mar 2021 07:01  -  03 Mar 2021 21:51  --------------------------------------------------------  IN: 550 mL / OUT: 3 mL / NET: 547 mL        PHYSICAL EXAM:  GENERAL: NAD, well-developed  HEAD:  Atraumatic, Normocephalic  EYES: EOMI, PERRLA, conjunctiva and sclera clear  NECK: Supple, No JVD  CHEST/LUNG: Clear to auscultation bilaterally; No wheeze  HEART: Regular rate and rhythm; No murmurs, rubs, or gallops  ABDOMEN: Soft, Nontender, Nondistended; Bowel sounds present  EXTREMITIES:  2+ Peripheral Pulses, No clubbing, cyanosis, or edema  PSYCH: AAOx3  NEUROLOGY: non-focal  SKIN: No rashes or lesions    LABS:                        9.4    0.72  )-----------( 162      ( 03 Mar 2021 07:10 )             28.0                     RADIOLOGY & ADDITIONAL TESTS:    Imaging Personally Reviewed:    Consultant(s) Notes Reviewed:      Care Discussed with Consultants/Other Providers:

## 2021-03-03 NOTE — PROGRESS NOTE ADULT - SUBJECTIVE AND OBJECTIVE BOX
Chief Complaint:  Patient is a 80y old  Female who presents with a chief complaint of neutropenic sepsis (02 Mar 2021 19:30)      Interval Events:   pt c/o persistent constipation  she also feels there is a nodule on her buttock    Allergies:  Bactrim (Rash)  dapsone (Other)  doxycycline (Rash)  sulfa drugs (Hives; Rash)      Hospital Medications:  acetaminophen   Tablet .. 650 milliGRAM(s) Oral every 6 hours PRN  acyclovir   Oral Tab/Cap 400 milliGRAM(s) Oral two times a day  calcium carbonate   1250 mG (OsCal) 1 Tablet(s) Oral daily  cefepime   IVPB      cefepime   IVPB 1000 milliGRAM(s) IV Intermittent every 8 hours  cholecalciferol 1000 Unit(s) Oral daily  clobetasol 0.05% Ointment 1 Application(s) Topical two times a day  magnesium citrate Oral Solution 1 Bottle Oral once  oxycodone    5 mG/acetaminophen 325 mG 1 Tablet(s) Oral every 6 hours PRN  pantoprazole    Tablet 40 milliGRAM(s) Oral before breakfast  polyethylene glycol 3350 17 Gram(s) Oral two times a day  posaconazole DR Tablet 300 milliGRAM(s) Oral daily  senna 2 Tablet(s) Oral at bedtime  sodium chloride 0.45%. 1000 milliLiter(s) IV Continuous <Continuous>  traMADol 50 milliGRAM(s) Oral every 8 hours PRN  vancomycin  IVPB      vancomycin  IVPB 1000 milliGRAM(s) IV Intermittent every 12 hours      PMHX/PSHX:      Family history:      ROS:     General:  No wt loss, fevers, chills, night sweats, fatigue,   Eyes:  Good vision, no reported pain  ENT:  No sore throat, pain, runny nose, dysphagia  CV:  No pain, palpitations, hypo/hypertension  Resp:  No dyspnea, cough, tachypnea, wheezing  GI:  See HPI  :  No pain, bleeding, incontinence, nocturia  Muscle:  No pain, weakness  Neuro:  No weakness, tingling, memory problems  Psych:  No fatigue, insomnia, mood problems, depression  Endocrine:  No polyuria, polydipsia, cold/heat intolerance  Heme:  No petechiae, ecchymosis, easy bruisability  Skin:  No rash, edema      PHYSICAL EXAM:     GENERAL:  Appears stated age, well-groomed, well-nourished, no distress  HEENT:  NC/AT,  conjunctivae clear, sclera-anicteric  NECK: Trachea midline, supple  CHEST:  Full & symmetric excursion, no increased effort, breath sounds clear  HEART:  Regular rhythm, no cora/heave  ABDOMEN:  Soft, non-tender, non-distended, normoactive bowel sounds,  no masses ,no hepato-splenomegaly,   EXTREMITIES:  no cyanosis,clubbing or edema  SKIN:  No rash/erythema/petechiae, no jaundice  NEURO:  Alert, oriented, no asterixis  RECTAL: Deferred    Vital Signs:  Vital Signs Last 24 Hrs  T(C): 36.2 (03 Mar 2021 11:24), Max: 37.4 (02 Mar 2021 16:20)  T(F): 97.2 (03 Mar 2021 11:24), Max: 99.4 (02 Mar 2021 16:20)  HR: 78 (03 Mar 2021 09:34) (66 - 92)  BP: 133/76 (03 Mar 2021 09:34) (110/63 - 146/78)  BP(mean): --  RR: 18 (03 Mar 2021 09:34) (18 - 18)  SpO2: 94% (03 Mar 2021 09:34) (94% - 96%)  Daily     Daily     LABS:                        9.4    0.72  )-----------( 162      ( 03 Mar 2021 07:10 )             28.0                     Imaging:             Chief Complaint:  Patient is a 80y old  Female who presents with a chief complaint of neutropenic sepsis (02 Mar 2021 19:30)      Interval Events:   pt c/o persistent constipation  she also feels there is a nodule on her buttock    Allergies:  Bactrim (Rash)  dapsone (Other)  doxycycline (Rash)  sulfa drugs (Hives; Rash)      Hospital Medications:  acetaminophen   Tablet .. 650 milliGRAM(s) Oral every 6 hours PRN  acyclovir   Oral Tab/Cap 400 milliGRAM(s) Oral two times a day  calcium carbonate   1250 mG (OsCal) 1 Tablet(s) Oral daily  cefepime   IVPB      cefepime   IVPB 1000 milliGRAM(s) IV Intermittent every 8 hours  cholecalciferol 1000 Unit(s) Oral daily  clobetasol 0.05% Ointment 1 Application(s) Topical two times a day  magnesium citrate Oral Solution 1 Bottle Oral once  oxycodone    5 mG/acetaminophen 325 mG 1 Tablet(s) Oral every 6 hours PRN  pantoprazole    Tablet 40 milliGRAM(s) Oral before breakfast  polyethylene glycol 3350 17 Gram(s) Oral two times a day  posaconazole DR Tablet 300 milliGRAM(s) Oral daily  senna 2 Tablet(s) Oral at bedtime  sodium chloride 0.45%. 1000 milliLiter(s) IV Continuous <Continuous>  traMADol 50 milliGRAM(s) Oral every 8 hours PRN  vancomycin  IVPB      vancomycin  IVPB 1000 milliGRAM(s) IV Intermittent every 12 hours      PMHX/PSHX:      Family history:      ROS:     General:  No wt loss, fevers, chills, night sweats, fatigue,   Eyes:  Good vision, no reported pain  ENT:  No sore throat, pain, runny nose, dysphagia  CV:  No pain, palpitations, hypo/hypertension  Resp:  No dyspnea, cough, tachypnea, wheezing  GI:  See HPI  :  No pain, bleeding, incontinence, nocturia  Muscle:  No pain, weakness  Neuro:  No weakness, tingling, memory problems  Psych:  No fatigue, insomnia, mood problems, depression  Endocrine:  No polyuria, polydipsia, cold/heat intolerance  Heme:  No petechiae, ecchymosis, easy bruisability  Skin:  No rash, edema      PHYSICAL EXAM:     GENERAL:  Appears stated age, well-groomed, well-nourished, no distress  HEENT:  NC/AT,  conjunctivae clear, sclera-anicteric  NECK: Trachea midline, supple  CHEST:  Full & symmetric excursion, no increased effort, breath sounds clear  HEART:  Regular rhythm, no cora/heave  ABDOMEN:  Soft, non-tender, non-distended, normoactive bowel sounds,  no masses ,no hepato-splenomegaly,   EXTREMITIES:  no cyanosis,clubbing or edema  SKIN:  No rash/erythema/petechiae, no jaundice, no palpable lesion on buttock, nodules on hands  NEURO:  Alert, oriented, no asterixis  RECTAL: Deferred    Vital Signs:  Vital Signs Last 24 Hrs  T(C): 36.2 (03 Mar 2021 11:24), Max: 37.4 (02 Mar 2021 16:20)  T(F): 97.2 (03 Mar 2021 11:24), Max: 99.4 (02 Mar 2021 16:20)  HR: 78 (03 Mar 2021 09:34) (66 - 92)  BP: 133/76 (03 Mar 2021 09:34) (110/63 - 146/78)  BP(mean): --  RR: 18 (03 Mar 2021 09:34) (18 - 18)  SpO2: 94% (03 Mar 2021 09:34) (94% - 96%)  Daily     Daily     LABS:                        9.4    0.72  )-----------( 162      ( 03 Mar 2021 07:10 )             28.0                     Imaging:

## 2021-03-03 NOTE — PROGRESS NOTE ADULT - SUBJECTIVE AND OBJECTIVE BOX
Infectious Diseases progress note:    Subjective: Feels better after steroids.  c/o constipation    ROS:  CONSTITUTIONAL:  No fever, chills, rigors  CARDIOVASCULAR:  No chest pain or palpitations  RESPIRATORY:   No SOB, cough, dyspnea on exertion.  No wheezing  GASTROINTESTINAL:  No abd pain, N/V, diarrhea/constipation  EXTREMITIES:  No swelling or joint pain  GENITOURINARY:  No burning on urination, increased frequency or urgency.  No flank pain  NEUROLOGIC:  No HA, visual disturbances  SKIN: No rashes    Allergies    Bactrim (Rash)  dapsone (Other)  doxycycline (Rash)  sulfa drugs (Hives; Rash)    Intolerances        ANTIBIOTICS/RELEVANT:  antimicrobials  acyclovir   Oral Tab/Cap 400 milliGRAM(s) Oral two times a day  cefepime   IVPB      cefepime   IVPB 1000 milliGRAM(s) IV Intermittent every 8 hours  posaconazole DR Tablet 300 milliGRAM(s) Oral daily  vancomycin  IVPB      vancomycin  IVPB 1000 milliGRAM(s) IV Intermittent every 12 hours    immunologic:    OTHER:  acetaminophen   Tablet .. 650 milliGRAM(s) Oral every 6 hours PRN  calcium carbonate   1250 mG (OsCal) 1 Tablet(s) Oral daily  cholecalciferol 1000 Unit(s) Oral daily  clobetasol 0.05% Ointment 1 Application(s) Topical two times a day  oxycodone    5 mG/acetaminophen 325 mG 1 Tablet(s) Oral every 6 hours PRN  pantoprazole    Tablet 40 milliGRAM(s) Oral before breakfast  polyethylene glycol 3350 17 Gram(s) Oral two times a day  senna 2 Tablet(s) Oral at bedtime  sodium chloride 0.45%. 1000 milliLiter(s) IV Continuous <Continuous>  traMADol 50 milliGRAM(s) Oral every 8 hours PRN      Objective:  Vital Signs Last 24 Hrs  T(C): 36.2 (03 Mar 2021 11:24), Max: 37.4 (02 Mar 2021 16:20)  T(F): 97.2 (03 Mar 2021 11:24), Max: 99.4 (02 Mar 2021 16:20)  HR: 78 (03 Mar 2021 09:34) (66 - 92)  BP: 133/76 (03 Mar 2021 09:34) (110/63 - 146/78)  BP(mean): --  RR: 18 (03 Mar 2021 09:34) (18 - 18)  SpO2: 94% (03 Mar 2021 09:34) (94% - 96%)    PHYSICAL EXAM:  Constitutional:NAD  Eyes:JUSTINO, EOMI  Ear/Nose/Throat: no thrush, mucositis.  Moist mucous membranes	  Neck:no JVD, no lymphadenopathy, supple  Respiratory: CTA torrie  Cardiovascular: S1S2 RRR, no murmurs  Gastrointestinal:soft, nontender,  nondistended (+) BS  Extremities:no e/e/c  Skin: rt hand violacious nodules.  rt ft dorsum wound no SOI (biopsy site)        LABS:                        9.4    0.72  )-----------( 162      ( 03 Mar 2021 07:10 )             28.0                       Vancomycin Level, Trough: 11.6 ug/mL (03-03 @ 07:10)  Vancomycin Level, Trough: 9.4 ug/mL (03-01 @ 07:12)              MICROBIOLOGY:          RADIOLOGY & ADDITIONAL STUDIES:

## 2021-03-03 NOTE — PROGRESS NOTE ADULT - ASSESSMENT
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 Pt reported that she noticed a skin lesion on her right middle finger about 1 week ago, which has worsened over time. Pt described the pain in the lesion as throbbing intermittent, worse when pressure is applied. Pt recently stopped taking levaquin and was switched to cephalexin by her oncologist Dr. Hall. In addition to the cephalexin, she takes acyclovir, and voriconazole. Pt reported that she had worsening pain in the lesion which prompted her to visit [facility], where it was unable to be drained. Her doctor suggested she come to [facility] for dermatologic expertise. In addition to the lesion in her R 3rd digit, she has noticed the starting of new similar lesions on her b/l hands and L foot. Pt says she had a temp of 100.3 and 99.9F earlier in the week, and has intermittent night sweats over the last two weeks. (27 Feb 2021 21:31)    Pt was also treated for thrush recently, states while on chemotherapy WBC dropped to 0.4.  Developed a left tongue lateral lesion which has now improved.     ER vitals:  T 97.8, P 110, /78.  WBC 0.86.  Rt hand xray no cortical erosions, (+) soft tissue prominence of PIP/DIP.  Pt seen by Derm, noted to have right distal third digit with gray-purple ulcerated tender nodule on erythematous base, left dorsal foot with gray-purple nodule on erythematous base, left index finger with pink papule on lateral side and skin colored papule on tip of left third middle finger.   Pt s/p punch biopsies performed for H&E as well as tissue culture to help distinguish between Sweet Syndrome vs infectious etiology.  Pt started on vanco/cefepime.       Neutropenic fever:    - Pt with low grade fevers at home, (+) leukopenia.  Pt on chemotherapy for AML.  Cont vanco/cefepime for now (cover for MRSA and pseudomonas).  Pt with several skin lesions, s/p recent attempted I&D of rt hand middle finger at [facility]    - Pt on acyclovir and posaconazole for additional prophylaxis against HSV/VZV and fungal infection.      - Check blood cx x 2 - ngtd    - Monitor Cbc with diff.    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  Send ESR, CRP (noted elevation)    - r/o infectious causes such as fungal, mycobacterial, bacterial sepsis.  f/u blood cx, wound cx (neg thus far)    - Check HIV (-), EBV/CMV, RPR, parvovirus, erhrlichia/babesia (- pcr)/Lyme (-).  Pt denies recent travel, bug or tick bites, sick contacts.       * Infectious w/u neg.  No evidence for MRSA infection, will d/c vanco.  Cont cefepime for now due to neutropenia.  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  States lesions feel less tender.  No SOI.      * Cont bowel regimen for constipation    Will follow,    No Jackson  [phone]

## 2021-03-03 NOTE — PROGRESS NOTE ADULT - ASSESSMENT
81 yo F w/ AML, CLL, hx L breast lumpectomy p/w R 3rd digit skin lesion x 1 week. Pt reported that she noticed a skin lesion on her right middle finger about 1 week ago, which has worsened over time. Pt described the pain in the lesion as throbbing intermittent, worse when pressure is applied.     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 Derm followed up on 3/2, ptn given IV Medrol 60 mg yesterday and today and starting tomorrow will be on Prednisone 40 mg daily. cont topical steroid ointment: clobetasol. lesions feel better with less pain  - hemodynamically stable,   -c/w broad spectrum antibiotics , ID on board  -pancytopenia 2/2 chemotherapy. neulasta will not be administered since ptn has h/o AML.   -ANC <500  -continue ppx medications, acyclovir, voriconazole   -f/u cultures   -CBC with diff daily   - "blistering lesions"  c/w SWEET SYNDROME as per derm.   - wbc slowly rising, still neutropenic, HH improved post PRBC on 3/1.   - ptn also c/o painful hard subcutaneous mass over left buttock, had it on admission, but forgot to mention. s/p CT w contrast: 3x2.8 cm lesions recommend MR w contrast to R/O hematoma vs mass. is it an additional lesion which is part of SWEET SYNDROME?     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

## 2021-03-03 NOTE — PROGRESS NOTE ADULT - ASSESSMENT
1) Favor Sweet Syndrome  - H&E potentially suggestive of neutrophilic dermatosis such as Sweet's syndrome or pyoderma gangrenosum. Organismal stains were all negative for infection.   - Strong clinical suspicion for a neutrophilic dermatoses. Low risk in treating with a few doses of IV steroids to monitor for clinical improvement especially as she remains on broad antimicrobial, antiviral and antifungal coverage.   - Tissue cultures all negative to date. HSV PCR negative.  Sweet syndrome, or acute febrile neutrophilic dermatosis, is an inflammatory disorder manifesting as multiple sterile, painful, edematous, erythematous plaques that are usually associated with fever and leukocytosis. The disease is typically skin-limited, although any organ system may also be affected. Other sites that may be affected include the oral mucosa, gastrointestinal tract, musculoskeletal system, lungs, kidneys, heart, and central nervous system (CNS). It may be seen in patients of all ages, but it is most common in women aged 20-60 and individuals with inflammatory bowel disease (IBD) or hematologic malignancies (especially myeloid leukemias and myelodysplastic syndrome). Although the exact etiology is still unclear, abnormal cytokine expression and atypical neutrophil function are thought to contribute to the pathogenesis. A genetic predisposition may also be operable.   - Maintain low threshold for pulmonary imaging should patient develop any respiratory symptoms  - C/w clobetasol 0.05% ointment BID to affected areas for up to 2 weeks on, then 1 week off.  - Stop IV methylprednisolone  - start prednisone 40 mg PO daily on 3/4. Patient will f/u closely with her primary outpatient dermatologist for dose tapering.  - C/w calcium 1200 mg daily and vitamin D 1000 IU daily for prophylaxis.    The patient's chart was reviewed in addition to being seen and examined at bedside with the dermatology attending Dr. Negrita Haque.  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 Organismal stains were all negative for infection.   - Strong clinical suspicion for a neutrophilic dermatoses. Low risk in treating with a few doses of IV steroids to monitor for clinical improvement especially as she remains on broad antimicrobial, antiviral and antifungal coverage.   - Tissue cultures all negative to date. HSV PCR negative.  Sweet syndrome, or acute febrile neutrophilic dermatosis, is an inflammatory disorder manifesting as multiple sterile, painful, edematous, erythematous plaques that are usually associated with fever and leukocytosis. The disease is typically skin-limited, although any organ system may also be affected. Other sites that may be affected include the oral mucosa, gastrointestinal tract, musculoskeletal system, lungs, kidneys, heart, and central nervous system (CNS). It may be seen in patients of all ages, but it is most common in women aged 20-60 and individuals with inflammatory bowel disease (IBD) or hematologic malignancies (especially myeloid leukemias and myelodysplastic syndrome). Although the exact etiology is still unclear, abnormal cytokine expression and atypical neutrophil function are thought to contribute to the pathogenesis. A genetic predisposition may also be operable.   - Maintain low threshold for pulmonary imaging should patient develop any respiratory symptoms  - C/w clobetasol 0.05% ointment BID to affected areas for up to 2 weeks on, then 1 week off.  - Stop IV methylprednisolone  - start prednisone 40 mg PO daily on 3/4. Patient will f/u closely with her primary outpatient dermatologist for dose tapering.  - C/w calcium 1200 mg daily and vitamin D 1000 IU daily for prophylaxis.      The patient's chart was reviewed in addition to being seen and examined at bedside with the dermatology attending Dr. Negrita Haque.

## 2021-03-03 NOTE — PROGRESS NOTE ADULT - SUBJECTIVE AND OBJECTIVE BOX
INTERVAL HPI/OVERNIGHT EVENTS:    f/u rash    -    MEDICATIONS  (STANDING):  acyclovir   Oral Tab/Cap 400 milliGRAM(s) Oral two times a day  calcium carbonate   1250 mG (OsCal) 1 Tablet(s) Oral daily  cefepime   IVPB      cefepime   IVPB 1000 milliGRAM(s) IV Intermittent every 8 hours  cholecalciferol 1000 Unit(s) Oral daily  clobetasol 0.05% Ointment 1 Application(s) Topical two times a day  magnesium citrate Oral Solution 1 Bottle Oral once  pantoprazole    Tablet 40 milliGRAM(s) Oral before breakfast  polyethylene glycol 3350 17 Gram(s) Oral two times a day  posaconazole DR Tablet 300 milliGRAM(s) Oral daily  senna 2 Tablet(s) Oral at bedtime  sodium chloride 0.45%. 1000 milliLiter(s) (60 mL/Hr) IV Continuous <Continuous>  vancomycin  IVPB      vancomycin  IVPB 1000 milliGRAM(s) IV Intermittent every 12 hours    MEDICATIONS  (PRN):  acetaminophen   Tablet .. 650 milliGRAM(s) Oral every 6 hours PRN Temp greater or equal to 38C (100.4F), Mild Pain (1 - 3)  oxycodone    5 mG/acetaminophen 325 mG 1 Tablet(s) Oral every 6 hours PRN Severe Pain (7 - 10)  traMADol 50 milliGRAM(s) Oral every 8 hours PRN Moderate Pain (4 - 6)      Allergies    Bactrim (Rash)  dapsone (Other)  doxycycline (Rash)  sulfa drugs (Hives; Rash)    Intolerances        REVIEW OF SYSTEMS      General: no fevers/chills, no NS	    Skin: see HPI  	  Ophthalmologic: no eye pain or change in vision    Genitourinary: no dysuria or hematuria    Musculoskeletal: no joint pains or weakness	    Neurological:no weakness or tingling          Vital Signs Last 24 Hrs  T(C): 36.2 (03 Mar 2021 11:24), Max: 37.4 (02 Mar 2021 16:20)  T(F): 97.2 (03 Mar 2021 11:24), Max: 99.4 (02 Mar 2021 16:20)  HR: 78 (03 Mar 2021 09:34) (66 - 92)  BP: 133/76 (03 Mar 2021 09:34) (110/63 - 146/78)  BP(mean): --  RR: 18 (03 Mar 2021 09:34) (18 - 18)  SpO2: 94% (03 Mar 2021 09:34) (94% - 96%)    PHYSICAL EXAM:   The patient was lying in bed and well nourished.  There was no visible lymphadenopathy.  Conjunctiva were non injected.  There was no clubbing of extremities.  There was no hyperhidrosis or bromhidrosis.    Of note on skin exam:       LABS:                        9.4    0.72  )-----------( 162      ( 03 Mar 2021 07:10 )             28.0                INTERVAL HPI/OVERNIGHT EVENTS:    f/u rash    - Pain much improved today, lesions stable    MEDICATIONS  (STANDING):  acyclovir   Oral Tab/Cap 400 milliGRAM(s) Oral two times a day  calcium carbonate   1250 mG (OsCal) 1 Tablet(s) Oral daily  cefepime   IVPB      cefepime   IVPB 1000 milliGRAM(s) IV Intermittent every 8 hours  cholecalciferol 1000 Unit(s) Oral daily  clobetasol 0.05% Ointment 1 Application(s) Topical two times a day  magnesium citrate Oral Solution 1 Bottle Oral once  pantoprazole    Tablet 40 milliGRAM(s) Oral before breakfast  polyethylene glycol 3350 17 Gram(s) Oral two times a day  posaconazole DR Tablet 300 milliGRAM(s) Oral daily  senna 2 Tablet(s) Oral at bedtime  sodium chloride 0.45%. 1000 milliLiter(s) (60 mL/Hr) IV Continuous <Continuous>  vancomycin  IVPB      vancomycin  IVPB 1000 milliGRAM(s) IV Intermittent every 12 hours    MEDICATIONS  (PRN):  acetaminophen   Tablet .. 650 milliGRAM(s) Oral every 6 hours PRN Temp greater or equal to 38C (100.4F), Mild Pain (1 - 3)  oxycodone    5 mG/acetaminophen 325 mG 1 Tablet(s) Oral every 6 hours PRN Severe Pain (7 - 10)  traMADol 50 milliGRAM(s) Oral every 8 hours PRN Moderate Pain (4 - 6)      Allergies    Bactrim (Rash)  dapsone (Other)  doxycycline (Rash)  sulfa drugs (Hives; Rash)    Intolerances        REVIEW OF SYSTEMS      General: no fevers/chills, no NS	    Skin: see HPI  	  Ophthalmologic: no eye pain or change in vision    Genitourinary: no dysuria or hematuria    Musculoskeletal: no joint pains or weakness	    Neurological:no weakness or tingling          Vital Signs Last 24 Hrs  T(C): 36.2 (03 Mar 2021 11:24), Max: 37.4 (02 Mar 2021 16:20)  T(F): 97.2 (03 Mar 2021 11:24), Max: 99.4 (02 Mar 2021 16:20)  HR: 78 (03 Mar 2021 09:34) (66 - 92)  BP: 133/76 (03 Mar 2021 09:34) (110/63 - 146/78)  BP(mean): --  RR: 18 (03 Mar 2021 09:34) (18 - 18)  SpO2: 94% (03 Mar 2021 09:34) (94% - 96%)    PHYSICAL EXAM:   The patient was lying in bed and well nourished.  There was no visible lymphadenopathy.  Conjunctiva were non injected.  There was no clubbing of extremities.  There was no hyperhidrosis or bromhidrosis.    Of note on skin exam:     Right distal third digit with gray-purple ulcerated tender nodule on erythematous base  R index finger with 2 violaceous papules  Left 4th finger with few violaceous papules  crusted violaceous papule on tip of left third middle finger  Left dorsal foot with resolving pink nodule  2 pink papules on back    LABS:                        9.4    0.72  )-----------( 162      ( 03 Mar 2021 07:10 )             28.0                INTERVAL HPI/OVERNIGHT EVENTS:    f/u rash    - Pain much improved today, lesions slightly improved    MEDICATIONS  (STANDING):  acyclovir   Oral Tab/Cap 400 milliGRAM(s) Oral two times a day  calcium carbonate   1250 mG (OsCal) 1 Tablet(s) Oral daily  cefepime   IVPB      cefepime   IVPB 1000 milliGRAM(s) IV Intermittent every 8 hours  cholecalciferol 1000 Unit(s) Oral daily  clobetasol 0.05% Ointment 1 Application(s) Topical two times a day  magnesium citrate Oral Solution 1 Bottle Oral once  pantoprazole    Tablet 40 milliGRAM(s) Oral before breakfast  polyethylene glycol 3350 17 Gram(s) Oral two times a day  posaconazole DR Tablet 300 milliGRAM(s) Oral daily  senna 2 Tablet(s) Oral at bedtime  sodium chloride 0.45%. 1000 milliLiter(s) (60 mL/Hr) IV Continuous <Continuous>  vancomycin  IVPB      vancomycin  IVPB 1000 milliGRAM(s) IV Intermittent every 12 hours    MEDICATIONS  (PRN):  acetaminophen   Tablet .. 650 milliGRAM(s) Oral every 6 hours PRN Temp greater or equal to 38C (100.4F), Mild Pain (1 - 3)  oxycodone    5 mG/acetaminophen 325 mG 1 Tablet(s) Oral every 6 hours PRN Severe Pain (7 - 10)  traMADol 50 milliGRAM(s) Oral every 8 hours PRN Moderate Pain (4 - 6)      Allergies    Bactrim (Rash)  dapsone (Other)  doxycycline (Rash)  sulfa drugs (Hives; Rash)    Intolerances        REVIEW OF SYSTEMS      General: no fevers/chills, no NS	    Skin: see HPI  	  Ophthalmologic: no eye pain or change in vision    Genitourinary: no dysuria or hematuria    Musculoskeletal: no joint pains or weakness	    Neurological:no weakness or tingling          Vital Signs Last 24 Hrs  T(C): 36.2 (03 Mar 2021 11:24), Max: 37.4 (02 Mar 2021 16:20)  T(F): 97.2 (03 Mar 2021 11:24), Max: 99.4 (02 Mar 2021 16:20)  HR: 78 (03 Mar 2021 09:34) (66 - 92)  BP: 133/76 (03 Mar 2021 09:34) (110/63 - 146/78)  BP(mean): --  RR: 18 (03 Mar 2021 09:34) (18 - 18)  SpO2: 94% (03 Mar 2021 09:34) (94% - 96%)    PHYSICAL EXAM:   The patient was lying in bed and well nourished.  There was no visible lymphadenopathy.  Conjunctiva were non injected.  There was no clubbing of extremities.  There was no hyperhidrosis or bromhidrosis.    Of note on skin exam:     Right distal third digit with gray-purple ulcerated tender nodule - improved in appearance  R index finger with 2 violaceous papules  Left 4th finger with few violaceous papules  crusted violaceous papule on tip of left third middle finger  Left dorsal foot with resolving pink nodule  2 pink papules on back    LABS:                        9.4    0.72  )-----------( 162      ( 03 Mar 2021 07:10 )             28.0

## 2021-03-03 NOTE — PROGRESS NOTE ADULT - ASSESSMENT
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 Recommendation: remains an issue despite BID miralax. WIll trial mag citrate X 1. Trying to avoid rectal therapies d/t neutropenia.     Problem/Recommendation - 2:  ·  Problem: Neutropenia.  Recommendation: on broad spectrum abx.      Problem/Recommendation - 3:  ·  Problem: Anemia.  Recommendation: without GI bleeding. Likely due to AML.  on PPI PPx.      Problem/Recommendation - 4:  ·  Problem: Skin lesion.  Recommendation: possible sweet syndrome s/p biopsy.      Problem/Recommendation - 5:  ·  Problem: Gluteal lesion on CT  -may require biopsy as well    Attending Attestation:   Differential diagnosis and plan of care discussed with patient after the evaluation  35 Minutes spent on total encounter of which more than fifty percent of the encounter was spent counseling and/or coordinating care by the attending physician.        Todd Curry M.D.   Gastroenterology and Hepatology  Cell: [phone].

## 2021-03-04 ENCOUNTER — TRANSCRIPTION ENCOUNTER (OUTPATIENT)
Age: 81
End: 2021-03-04

## 2021-03-04 LAB
CULTURE RESULTS: SIGNIFICANT CHANGE UP
CULTURE RESULTS: SIGNIFICANT CHANGE UP
HCT VFR BLD CALC: 25.5 % — LOW (ref 34.5–45)
HGB BLD-MCNC: 8.3 G/DL — LOW (ref 11.5–15.5)
MCHC RBC-ENTMCNC: 31.9 PG — SIGNIFICANT CHANGE UP (ref 27–34)
MCHC RBC-ENTMCNC: 32.5 GM/DL — SIGNIFICANT CHANGE UP (ref 32–36)
MCV RBC AUTO: 98.1 FL — SIGNIFICANT CHANGE UP (ref 80–100)
NRBC # BLD: 0 /100 WBCS — SIGNIFICANT CHANGE UP (ref 0–0)
PLATELET # BLD AUTO: 244 K/UL — SIGNIFICANT CHANGE UP (ref 150–400)
RBC # BLD: 2.6 M/UL — LOW (ref 3.8–5.2)
RBC # FLD: 16.9 % — HIGH (ref 10.3–14.5)
SPECIMEN SOURCE: SIGNIFICANT CHANGE UP
SPECIMEN SOURCE: SIGNIFICANT CHANGE UP
WBC # BLD: 0.82 K/UL — CRITICAL LOW (ref 3.8–10.5)
WBC # FLD AUTO: 0.82 K/UL — CRITICAL LOW (ref 3.8–10.5)

## 2021-03-04 PROCEDURE — 72197 MRI PELVIS W/O & W/DYE: CPT | Mod: 26

## 2021-03-04 RX ORDER — ALPRAZOLAM 0.25 MG
0.5 TABLET ORAL ONCE
Refills: 0 | Status: DISCONTINUED | OUTPATIENT
Start: 2021-03-04 | End: 2021-03-04

## 2021-03-04 RX ADMIN — Medication 1 APPLICATION(S): at 17:21

## 2021-03-04 RX ADMIN — Medication 40 MILLIGRAM(S): at 05:44

## 2021-03-04 RX ADMIN — OXYCODONE AND ACETAMINOPHEN 1 TABLET(S): 5; 325 TABLET ORAL at 23:59

## 2021-03-04 RX ADMIN — SODIUM CHLORIDE 60 MILLILITER(S): 9 INJECTION, SOLUTION INTRAVENOUS at 00:07

## 2021-03-04 RX ADMIN — CEFEPIME 100 MILLIGRAM(S): 1 INJECTION, POWDER, FOR SOLUTION INTRAMUSCULAR; INTRAVENOUS at 13:19

## 2021-03-04 RX ADMIN — Medication 250 MILLIGRAM(S): at 17:18

## 2021-03-04 RX ADMIN — CEFEPIME 100 MILLIGRAM(S): 1 INJECTION, POWDER, FOR SOLUTION INTRAMUSCULAR; INTRAVENOUS at 05:42

## 2021-03-04 RX ADMIN — Medication 1 TABLET(S): at 12:04

## 2021-03-04 RX ADMIN — Medication 400 MILLIGRAM(S): at 05:44

## 2021-03-04 RX ADMIN — PANTOPRAZOLE SODIUM 40 MILLIGRAM(S): 20 TABLET, DELAYED RELEASE ORAL at 05:44

## 2021-03-04 RX ADMIN — Medication 1 APPLICATION(S): at 05:45

## 2021-03-04 RX ADMIN — Medication 0.5 MILLIGRAM(S): at 23:56

## 2021-03-04 RX ADMIN — Medication 1000 UNIT(S): at 12:05

## 2021-03-04 RX ADMIN — POSACONAZOLE 300 MILLIGRAM(S): 100 TABLET, DELAYED RELEASE ORAL at 12:05

## 2021-03-04 RX ADMIN — Medication 250 MILLIGRAM(S): at 06:52

## 2021-03-04 RX ADMIN — Medication 400 MILLIGRAM(S): at 17:19

## 2021-03-04 NOTE — DISCHARGE NOTE PROVIDER - CARE PROVIDERS DIRECT ADDRESSES
,[email or web address] ,[email or web address] [email or web address],[email or web address] ,[email or web address],DirectAddress_Unknown,DirectAddress_Unknown

## 2021-03-04 NOTE — PROGRESS NOTE ADULT - SUBJECTIVE AND OBJECTIVE BOX
Patient is a 80y old  Female who presents with a chief complaint of neutropenic sepsis (04 Mar 2021 20:05)      SUBJECTIVE / OVERNIGHT EVENTS: ptn asked for a referral for a new PCP near the hospital. wants to switch from her doctor in [place], the mass on left buttock has resolved on exam since initiation of steroids    MEDICATIONS  (STANDING):  acyclovir   Oral Tab/Cap 400 milliGRAM(s) Oral two times a day  calcium carbonate   1250 mG (OsCal) 1 Tablet(s) Oral daily  cefepime   IVPB      cefepime   IVPB 1000 milliGRAM(s) IV Intermittent every 8 hours  cholecalciferol 1000 Unit(s) Oral daily  clobetasol 0.05% Ointment 1 Application(s) Topical two times a day  pantoprazole    Tablet 40 milliGRAM(s) Oral before breakfast  polyethylene glycol 3350 17 Gram(s) Oral two times a day  posaconazole DR Tablet 300 milliGRAM(s) Oral daily  predniSONE   Tablet 40 milliGRAM(s) Oral daily  senna 2 Tablet(s) Oral at bedtime  sodium chloride 0.45%. 1000 milliLiter(s) (60 mL/Hr) IV Continuous <Continuous>  vancomycin  IVPB      vancomycin  IVPB 1000 milliGRAM(s) IV Intermittent every 12 hours    MEDICATIONS  (PRN):  acetaminophen   Tablet .. 650 milliGRAM(s) Oral every 6 hours PRN Temp greater or equal to 38C (100.4F), Mild Pain (1 - 3)  oxycodone    5 mG/acetaminophen 325 mG 1 Tablet(s) Oral every 6 hours PRN Severe Pain (7 - 10)  traMADol 50 milliGRAM(s) Oral every 8 hours PRN Moderate Pain (4 - 6)      Vital Signs Last 24 Hrs  T(F): 97.9 (03-04-21 @ 16:01), Max: 98.2 (03-04-21 @ 05:10)  HR: 70 (03-04-21 @ 16:01) (69 - 82)  BP: 123/80 (03-04-21 @ 16:01) (112/60 - 155/74)  RR: 18 (03-04-21 @ 16:01) (18 - 18)  SpO2: 96% (03-04-21 @ 16:01) (94% - 100%)  Telemetry:   CAPILLARY BLOOD GLUCOSE        I&O's Summary    03 Mar 2021 07:01  -  04 Mar 2021 07:00  --------------------------------------------------------  IN: 1520 mL / OUT: 3 mL / NET: 1517 mL    04 Mar 2021 07:01  -  04 Mar 2021 20:07  --------------------------------------------------------  IN: 1380 mL / OUT: 0 mL / NET: 1380 mL        PHYSICAL EXAM:  GENERAL: NAD, well-developed  HEAD:  Atraumatic, Normocephalic  EYES: EOMI, PERRLA, conjunctiva and sclera clear  NECK: Supple, No JVD  CHEST/LUNG: Clear to auscultation bilaterally; No wheeze  HEART: Regular rate and rhythm; No murmurs, rubs, or gallops  ABDOMEN: Soft, Nontender, Nondistended; Bowel sounds present  EXTREMITIES:  2+ Peripheral Pulses, No clubbing, cyanosis, or edema  PSYCH: AAOx3  NEUROLOGY: non-focal  SKIN: No rashes or lesions    LABS:                        8.3    0.82  )-----------( 244      ( 04 Mar 2021 07:15 )             25.5                     RADIOLOGY & ADDITIONAL TESTS:    Imaging Personally Reviewed:    Consultant(s) Notes Reviewed:      Care Discussed with Consultants/Other Providers:

## 2021-03-04 NOTE — PROGRESS NOTE ADULT - SUBJECTIVE AND OBJECTIVE BOX
Chief Complaint:  Patient is a 80y old  Female who presents with a chief complaint of neutropenic sepsis (03 Mar 2021 21:50)      Interval Events:   took mag citrate  having multiple loose BM    Allergies:  Bactrim (Rash)  dapsone (Other)  doxycycline (Rash)  sulfa drugs (Hives; Rash)      Hospital Medications:  acetaminophen   Tablet .. 650 milliGRAM(s) Oral every 6 hours PRN  acyclovir   Oral Tab/Cap 400 milliGRAM(s) Oral two times a day  calcium carbonate   1250 mG (OsCal) 1 Tablet(s) Oral daily  cefepime   IVPB      cefepime   IVPB 1000 milliGRAM(s) IV Intermittent every 8 hours  cholecalciferol 1000 Unit(s) Oral daily  clobetasol 0.05% Ointment 1 Application(s) Topical two times a day  oxycodone    5 mG/acetaminophen 325 mG 1 Tablet(s) Oral every 6 hours PRN  pantoprazole    Tablet 40 milliGRAM(s) Oral before breakfast  polyethylene glycol 3350 17 Gram(s) Oral two times a day  posaconazole DR Tablet 300 milliGRAM(s) Oral daily  predniSONE   Tablet 40 milliGRAM(s) Oral daily  senna 2 Tablet(s) Oral at bedtime  sodium chloride 0.45%. 1000 milliLiter(s) IV Continuous <Continuous>  traMADol 50 milliGRAM(s) Oral every 8 hours PRN  vancomycin  IVPB      vancomycin  IVPB 1000 milliGRAM(s) IV Intermittent every 12 hours      PMHX/PSHX:      Family history:      ROS:     General:  No wt loss, fevers, chills, night sweats, fatigue,   Eyes:  Good vision, no reported pain  ENT:  No sore throat, pain, runny nose, dysphagia  CV:  No pain, palpitations, hypo/hypertension  Resp:  No dyspnea, cough, tachypnea, wheezing  GI:  See HPI  :  No pain, bleeding, incontinence, nocturia  Muscle:  No pain, weakness  Neuro:  No weakness, tingling, memory problems  Psych:  No fatigue, insomnia, mood problems, depression  Endocrine:  No polyuria, polydipsia, cold/heat intolerance  Heme:  No petechiae, ecchymosis, easy bruisability  Skin:  No rash, edema      PHYSICAL EXAM:     GENERAL:  Appears stated age, well-groomed, well-nourished, no distress  HEENT:  NC/AT,  conjunctivae clear, sclera-anicteric  NECK: Trachea midline, supple  CHEST:  Full & symmetric excursion, no increased effort, breath sounds clear  HEART:  Regular rhythm, no cora/heave  ABDOMEN:  Soft, non-tender, non-distended, normoactive bowel sounds,  no masses ,no hepato-splenomegaly,   EXTREMITIES:  no cyanosis,clubbing or edema  SKIN:  No rash/erythema/petechiae, no jaundice  NEURO:  Alert, oriented, no asterixis  RECTAL: Deferred    Vital Signs:  Vital Signs Last 24 Hrs  T(C): 36.7 (04 Mar 2021 08:18), Max: 36.8 (04 Mar 2021 05:10)  T(F): 98.1 (04 Mar 2021 08:18), Max: 98.2 (04 Mar 2021 05:10)  HR: 73 (04 Mar 2021 08:18) (69 - 87)  BP: 122/72 (04 Mar 2021 08:18) (112/60 - 155/74)  BP(mean): --  RR: 18 (04 Mar 2021 08:18) (18 - 18)  SpO2: 94% (04 Mar 2021 08:18) (94% - 100%)  Daily     Daily     LABS:                        8.3    0.82  )-----------( 244      ( 04 Mar 2021 07:15 )             25.5                     Imaging:

## 2021-03-04 NOTE — DISCHARGE NOTE PROVIDER - HOSPITAL COURSE
81 yo F w/ AML, CLL, hx L breast lumpectomy p/w R 3rd digit skin lesion x 1 week. Pt reported that she noticed a skin lesion on her right middle finger about 1 week ago, which has worsened over time. Pt described the pain in the lesion as throbbing intermittent, worse when pressure is applied.     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 Derm followed up on 3/2, ptn given IV Medrol 60 mg yesterday and today and starting on 3/4 will be on Prednisone 40 mg daily. cont topical steroid ointment: clobetasol. lesions feel better with less pain  - hemodynamically stable,   -c/w broad spectrum antibiotics , ID on board  -pancytopenia 2/2 chemotherapy. neulasta will not be administered since ptn has h/o AML.   -ANC <500. DC planning d/w ID. since ptn has been stable would recommend DC home on po LEVAQUIN with twice per week follow up for cbc, next F/U is w Dr. Hall on 3/8  -continue ppx medications, acyclovir, voriconazole   -f/u cultures all NTD  -CBC with diff daily   - "blistering lesions"  c/w SWEET SYNDROME as per derm. all nearly resolved  - wbc slowly rising, still neutropenic, HH improved post PRBC on 3/1.   - ptn also c/o painful hard subcutaneous mass over left buttock, had it on admission, but forgot to mention. s/p CT w contrast: 3x2.8 cm lesions recommend MR w contrast to R/O hematoma vs mass. on exam the mass has resolved since initiation of steroids. was  it an additional lesion which is part of SWEET SYNDROME? 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 Kristi Perry and to Dr Pam Smith respectively 79 yo F w/ AML, CLL, hx L breast lumpectomy p/w R 3rd digit skin lesion x 1 week. Pt reported that she noticed a skin lesion on her right middle finger about 1 week ago, which has worsened over time. Pt described the pain in the lesion as throbbing intermittent, worse when pressure is applied.     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 Derm followed up on 3/2, ptn given IV Medrol 60 mg yesterday and today and starting on 3/4 will be on Prednisone 40 mg daily. cont topical steroid ointment: clobetasol. lesions feel better with less pain  - hemodynamically stable,   -c/w broad spectrum antibiotics , ID on board  -pancytopenia 2/2 chemotherapy. neulasta will not be administered since ptn has h/o AML.   -ANC <500. DC planning d/w ID. since ptn has been stable would recommend DC home on po LEVAQUIN with twice per week follow up for cbc, next F/U is w Dr. Hall on 3/8  -continue ppx medications, acyclovir, voriconazole   -f/u cultures all NTD  -CBC with diff daily   - "blistering lesions"  c/w SWEET SYNDROME as per derm. all nearly resolved  - wbc slowly rising, still neutropenic, HH improved post PRBC on 3/1.   - ptn also c/o painful hard subcutaneous mass over left buttock, had it on admission, but forgot to mention. s/p CT w contrast: 3x2.8 cm lesions recommend MR w contrast to R/O hematoma vs mass. on exam the mass has resolved since initiation of steroids. was  it an additional lesion which is part of SWEET SYNDROME? 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 Kristi Perry and to Dr Pam Smith respectively      Patient is now medically cleared for discharge home by Dr. Kelly with follow-up as advised.

## 2021-03-04 NOTE — PROGRESS NOTE ADULT - ASSESSMENT
79 yo F w/ AML, CLL, hx L breast lumpectomy p/w R 3rd digit skin lesion x 1 week. Pt reported that she noticed a skin lesion on her right middle finger about 1 week ago, which has worsened over time. Pt described the pain in the lesion as throbbing intermittent, worse when pressure is applied.     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 Derm followed up on 3/2, ptn given IV Medrol 60 mg yesterday and today and starting on 3/4 will be on Prednisone 40 mg daily. cont topical steroid ointment: clobetasol. lesions feel better with less pain  - hemodynamically stable,   -c/w broad spectrum antibiotics , ID on board  -pancytopenia 2/2 chemotherapy. neulasta will not be administered since ptn has h/o AML.   -ANC <500. DC planning d/w ID. since ptn has been stable would recommend DC home on po Cipro with twice per week follow up for cbc  -continue ppx medications, acyclovir, voriconazole   -f/u cultures all NTD  -CBC with diff daily   - "blistering lesions"  c/w SWEET SYNDROME as per derm. all nearly resolved  - wbc slowly rising, still neutropenic, HH improved post PRBC on 3/1.   - ptn also c/o painful hard subcutaneous mass over left buttock, had it on admission, but forgot to mention. s/p CT w contrast: 3x2.8 cm lesions recommend MR w contrast to R/O hematoma vs mass. on exam the mass has resolved since initiation of steroids. was  it an additional lesion which is part of SWEET SYNDROME? 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 Kristi Perry and to Dr Pam Smith respectively

## 2021-03-04 NOTE — DISCHARGE NOTE PROVIDER - NSDCCPCAREPLAN_GEN_ALL_CORE_FT
PRINCIPAL DISCHARGE DIAGNOSIS  Diagnosis: Cellulitis  Assessment and Plan of Treatment: S/P IV antibiotics   Follow-up with your primary care provder --call for appointment      SECONDARY DISCHARGE DIAGNOSES  Diagnosis: CLL (chronic lymphocytic leukemia)  Assessment and Plan of Treatment: ptn asked for outptn PCP referral and referral to a hematologist at [facility] : refer to Dr. Kristi Perry and to Dr Pam Smith respectively    Diagnosis: Sweet syndrome  Assessment and Plan of Treatment: -Continue prednisone as prescribed   -Follow-up with Dermatology for prednisone taper and for further monitoring and treatment       Diagnosis: Neutropenia  Assessment and Plan of Treatment: Continue medicaations as prescribed   LEVAQUIN with twice per week follow up for cbc, next F/U is w Dr. Hall on 3/8     PRINCIPAL DISCHARGE DIAGNOSIS  Diagnosis: Cellulitis  Assessment and Plan of Treatment: S/P IV antibiotics   Follow-up with your primary care provder --call for appointment      SECONDARY DISCHARGE DIAGNOSES  Diagnosis: CLL (chronic lymphocytic leukemia)  Assessment and Plan of Treatment: ptn asked for outptn PCP referral and referral to a hematologist at [facility] : refer to Dr. Kristi Perry and to Dr Pam Smith respectively    Diagnosis: Sweet syndrome  Assessment and Plan of Treatment: -Continue prednisone as prescribed   -Follow-up with your private Dermatologist or Dr. Rianna Haque  in one week for prednisone taper and for further monitoring and treatment       Diagnosis: Neutropenia  Assessment and Plan of Treatment: Continue medicaations as prescribed   LEVAQUIN with twice per week follow up for cbc, next F/U is w Dr. Hall on 3/8

## 2021-03-04 NOTE — CHART NOTE - NSCHARTNOTEFT_GEN_A_CORE
81 yo F w/ T-AML, CLL, hx L breast lumpectomy p/w R 3rd digit skin lesion x 1 week. Pt reported that she noticed a skin lesion on her right middle finger about 1 week ago, which has worsened over time. Pt described the pain in the lesion as throbbing intermittent, worse when pressure is applied.     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 S/p left foot lesion biopsy  -f/u biopsy results   -c/w broad spectrum antibiotics   - please obtain CBC with diff daily  -hold on GCSF for now as patient not overtly septic and appears to be improving; additionally has molecular evidence of persistent AML    -f/u cultures   -f/u ID and Derm recs   #Treatment related AML   -likely related to prior chemotherapy for CLL (FCR)    -p53 mut, ASXL1 mut, DNMT3A mut, monosomal karyotype  -On azacitadine and venetoclax s/p 2 cycles (last dose azacitadine 02/23, molecular evidence of disease)  -venetoclax on hold 2/2 cytopenias  -pancytopenia 2/2 chemotherapy and underlying AML  -recommend continue ppx medications, acyclovir, voriconazole   -transfuse to goal Hb >7, Plt >10k (>15k if febrile or >50k if bleeding)  -outpatient f/u with Dr. 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
Patient's H/H 6.3/19.5, wbc 0.65 today. Patient with AML,   Hematology following. 1 prbc ordered per Dr Kelly  Hem paged awaiting call back  Tammie Valdez NP  [phone]
Wound Care Team Note:    RN request for wound care team consult for patient for finger and foot wounds received. Ms. Anderson is being managed by Dermatology for skin conditions including wounds. Will defer to Dermatology for management and will not follow. Spoke with clinical nurse.    Kathleen Miller, NP-C, [facility] [id number]
Writer assisting Tammie Valdez NP for ordering solumedrol for patient that needs a stat dose.  Advised by Dermatology Dr Haque to administer methylprednisolone 60 mg IV once now, and give 2nd dose of 60 mg IV on 3/3 in morning for treatment possibility of neutrophilic dermatosis.
Dermatology Chart Note    f/u neutrophilic dermatosis    - Lesions continue to improve, pain much improved.  - C/w prednisone 40 mg PO daily. Patient will f/u closely with her primary outpatient dermatologist for dose tapering.  - Gluteal lesion shown on CT is of unclear etiology (perhaps hematoma?). There can be a subcutaneous component to Sweet's syndrome though this is rare. Patient states this lesion is no longer painful  - C/w calcium 1200 mg daily and vitamin D 1000 IU daily for prophylaxis.  - From dermatologic perspective, ok to discharge on above prednisone and calcium/Vit D supplementation.    Discussed with primary team.  Discussed with attending, Dr. Haque.    - Pt can follow up with her primary dermatologist or with [facility] at our outpatient clinic located at [address] [place] [address], [address], [place], [place] ([phone]) in 1 week after discharge from the hospital.    Lucas Fitch MD, TRAN  PGY3, Dermatology
[facility]  I called her office and let her know to see the patient today.    House hematology will sign off.       Gio Scott MD  Hematology/Oncology Fellow

## 2021-03-04 NOTE — DISCHARGE NOTE PROVIDER - NSDCMRMEDTOKEN_GEN_ALL_CORE_FT
letrozole 2.5 mg oral tablet: 1 tab(s) orally once a day  PriLOSEC OTC 20 mg oral delayed release tablet: 1 tab(s) orally once a day, As Needed  Xanax 0.5 mg oral tablet: 1 tab(s) orally 3 times a day, As Needed   acyclovir 400 mg oral tablet: 1 tab(s) orally 2 times a day  calcium carbonate 1250 mg (500 mg elemental calcium) oral tablet: 1 tab(s) orally once a day  cholecalciferol oral tablet: 1000 unit(s) orally once a day  letrozole 2.5 mg oral tablet: 1 tab(s) orally once a day  Levaquin 500 mg oral tablet: 1 tab(s) orally every 24 hours  pantoprazole 40 mg oral delayed release tablet: 1 tab(s) orally once a day (before a meal)  senna oral tablet: 2 tab(s) orally once a day (at bedtime)  Xanax 0.5 mg oral tablet: 1 tab(s) orally 3 times a day, As Needed   acyclovir 400 mg oral tablet: 1 tab(s) orally 2 times a day  calcium carbonate 1250 mg (500 mg elemental calcium) oral tablet: 1 tab(s) orally once a day  cholecalciferol oral tablet: 1000 unit(s) orally once a day  letrozole 2.5 mg oral tablet: 1 tab(s) orally once a day  Levaquin 500 mg oral tablet: 1 tab(s) orally every 24 hours  pantoprazole 40 mg oral delayed release tablet: 1 tab(s) orally once a day (before a meal)  senna oral tablet: 2 tab(s) orally once a day (at bedtime)  voriconazole 200 mg oral tablet: 1 tab(s) orally once a day  Xanax 0.5 mg oral tablet: 1 tab(s) orally 3 times a day, As Needed   acyclovir 400 mg oral tablet: 1 tab(s) orally 2 times a day  calcium carbonate 1250 mg (500 mg elemental calcium) oral tablet: 1 tab(s) orally once a day  cholecalciferol oral tablet: 1000 unit(s) orally once a day  clobetasol 0.05% topical ointment: 1 application topically 2 times a day  letrozole 2.5 mg oral tablet: 1 tab(s) orally once a day  Levaquin 500 mg oral tablet: 1 tab(s) orally every 24 hours  pantoprazole 40 mg oral delayed release tablet: 1 tab(s) orally once a day (before a meal)  predniSONE 20 mg oral tablet: 2 tab(s) orally once a day  senna oral tablet: 2 tab(s) orally once a day (at bedtime)  voriconazole 200 mg oral tablet: 1 tab(s) orally once a day  Xanax 0.5 mg oral tablet: 1 tab(s) orally 3 times a day, As Needed   acyclovir 400 mg oral tablet: 1 tab(s) orally 2 times a day  calcium carbonate 1250 mg (500 mg elemental calcium) oral tablet: 1 tab(s) orally once a day  cholecalciferol oral tablet: 1000 unit(s) orally once a day  clobetasol 0.05% topical ointment: 1 application topically 2 times a day  letrozole 2.5 mg oral tablet: 1 tab(s) orally once a day  Levaquin 500 mg oral tablet: 1 tab(s) orally every 24 hours  pantoprazole 40 mg oral delayed release tablet: 1 tab(s) orally once a day (before a meal)  polyethylene glycol 3350 oral powder for reconstitution: 17 gram(s) orally 2 times a day  predniSONE 20 mg oral tablet: 2 tab(s) orally once a day  senna oral tablet: 2 tab(s) orally once a day (at bedtime)  voriconazole 200 mg oral tablet: 1 tab(s) orally once a day  Xanax 0.5 mg oral tablet: 1 tab(s) orally 3 times a day, As Needed

## 2021-03-04 NOTE — PROGRESS NOTE ADULT - SUBJECTIVE AND OBJECTIVE BOX
Infectious Diseases progress note:    Subjective: Afebrile, no new somatic complaints.      ROS:  CONSTITUTIONAL:  No fever, chills, rigors  CARDIOVASCULAR:  No chest pain or palpitations  RESPIRATORY:   No SOB, cough, dyspnea on exertion.  No wheezing  GASTROINTESTINAL:  No abd pain, N/V, diarrhea/constipation  EXTREMITIES:  No swelling or joint pain  GENITOURINARY:  No burning on urination, increased frequency or urgency.  No flank pain  NEUROLOGIC:  No HA, visual disturbances  SKIN: No rashes    Allergies    Bactrim (Rash)  dapsone (Other)  doxycycline (Rash)  sulfa drugs (Hives; Rash)    Intolerances        ANTIBIOTICS/RELEVANT:  antimicrobials  acyclovir   Oral Tab/Cap 400 milliGRAM(s) Oral two times a day  posaconazole DR Tablet 300 milliGRAM(s) Oral daily    immunologic:    OTHER:  acetaminophen   Tablet .. 650 milliGRAM(s) Oral every 6 hours PRN  calcium carbonate   1250 mG (OsCal) 1 Tablet(s) Oral daily  cholecalciferol 1000 Unit(s) Oral daily  clobetasol 0.05% Ointment 1 Application(s) Topical two times a day  oxycodone    5 mG/acetaminophen 325 mG 1 Tablet(s) Oral every 6 hours PRN  pantoprazole    Tablet 40 milliGRAM(s) Oral before breakfast  polyethylene glycol 3350 17 Gram(s) Oral two times a day  predniSONE   Tablet 40 milliGRAM(s) Oral daily  senna 2 Tablet(s) Oral at bedtime  sodium chloride 0.45%. 1000 milliLiter(s) IV Continuous <Continuous>  traMADol 50 milliGRAM(s) Oral every 8 hours PRN      Objective:  Vital Signs Last 24 Hrs  T(C): 36.6 (04 Mar 2021 20:22), Max: 36.8 (04 Mar 2021 05:10)  T(F): 97.8 (04 Mar 2021 20:22), Max: 98.2 (04 Mar 2021 05:10)  HR: 79 (04 Mar 2021 20:22) (69 - 82)  BP: 162/83 (04 Mar 2021 20:22) (112/60 - 162/83)  BP(mean): --  RR: 18 (04 Mar 2021 20:22) (18 - 18)  SpO2: 99% (04 Mar 2021 20:22) (94% - 100%)    PHYSICAL EXAM:  Constitutional:NAD  Eyes:JUSTINO, EOMI  Ear/Nose/Throat: no thrush, mucositis.  Moist mucous membranes	  Neck:no JVD, no lymphadenopathy, supple  Respiratory: CTA torrie  Cardiovascular: S1S2 RRR, no murmurs  Gastrointestinal:soft, nontender,  nondistended (+) BS  Extremities:no e/e/c  Skin:  no rashes, open wounds or ulcerations        LABS:                        8.3    0.82  )-----------( 244      ( 04 Mar 2021 07:15 )             25.5           Vancomycin Level, Trough: 11.6 ug/mL (03-03 @ 07:10)  Vancomycin Level, Trough: 9.4 ug/mL (03-01 @ 07:12)              MICROBIOLOGY:          RADIOLOGY & ADDITIONAL STUDIES:    < from: CT Pelvis w/ IV Cont (03.02.21 @ 19:36) >    IMPRESSION:    High attenuation 3.1 cm lesion along the medial aspect of the right gluteus lyubov muscle with surrounding soft tissue edema and fat stranding, which may reflect hematoma. If evaluation for neoplasm is of clinical interest, consider contrast-enhanced pelvic MRI.    < end of copied text >        < from: Xray Finger, Right Hand (02.27.21 @ 18:14) >  IMPRESSION:  There is no fracture or dislocation.  The joint spaces are preserved.  No cortical erosions or periosteal reactions to suggest advanced osteomyelitis. No tracking subcutaneous gas collections.  Focal soft tissue prominence at the PIP and DIP dorsally.    < end of copied text >

## 2021-03-04 NOTE — PROGRESS NOTE ADULT - ASSESSMENT
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 Recommendation: good response to mag citrate.  - Trying to avoid rectal therapies d/t neutropenia.  - Continue miralax BID, titrate down for diarrhea     Problem/Recommendation - 2:  ·  Problem: Neutropenia.  Recommendation: on broad spectrum abx.      Problem/Recommendation - 3:  ·  Problem: Anemia.  Recommendation: without GI bleeding. Likely due to AML.  on PPI PPx.      Problem/Recommendation - 4:  ·  Problem: Skin lesion.  Recommendation: possible sweet syndrome s/p biopsy.      Problem/Recommendation - 5:  ·  Problem: Gluteal lesion on CT  -may require biopsy as well    Attending Attestation:   Differential diagnosis and plan of care discussed with patient after the evaluation  35 Minutes spent on total encounter of which more than fifty percent of the encounter was spent counseling and/or coordinating care by the attending physician.        Todd Curry M.D.   Gastroenterology and Hepatology  Cell: [phone].

## 2021-03-04 NOTE — DISCHARGE NOTE PROVIDER - PROVIDER TOKENS
PROVIDER:[TOKEN:[[id number]],FOLLOWUP:[1 week]],PROVIDER:[TOKEN:[[id number]],FOLLOWUP:[1 week]] PROVIDER:[TOKEN:[[id number]],FOLLOWUP:[1 week]],PROVIDER:[TOKEN:[[id number]],FOLLOWUP:[1 week]],PROVIDER:[TOKEN:[[id number]]] PROVIDER:[TOKEN:[[id number]],FOLLOWUP:[1 week]],PROVIDER:[TOKEN:[[id number]],FOLLOWUP:[1 week]],PROVIDER:[TOKEN:[[id number]]],PROVIDER:[TOKEN:[[id number]],FOLLOWUP:[2 weeks]]

## 2021-03-04 NOTE — CHART NOTE - NSCHARTNOTESELECT_GEN_ALL_CORE
Dermatology/Event Note
Hematology Chart Note
Event Note
Event Note
Hematology Chart Note
Wound Care Team Note:/Event Note

## 2021-03-04 NOTE — DISCHARGE NOTE PROVIDER - CARE PROVIDER_API CALL
Kristi Perry)  Internal Medicine  [address], [address]  [place] [address]  Phone: [phone]  Fax: [phone]  Follow Up Time: 1 week    Pam Smith)  Hematology; Internal Medicine; Medical Oncology  [address] [place] [address]  Phone: [phone]  Fax: [phone]  Follow Up Time: 1 week   Kristi Perry)  Internal Medicine  [address] [facility], [address]  [place] [address]  Phone: [phone]  Fax: [phone]  Follow Up Time: 1 week    Pam Smith)  Hematology; Internal Medicine; Medical Oncology  [address] [place] [address]  Phone: [phone]  Fax: [phone]  Follow Up Time: 1 week    Negrita Haque)  Medicine  Dermatology  [address], [address]  [place] [address]  Phone: [phone]  Fax: [phone]  Follow Up Time:    Kristi Perry)  Internal Medicine  [address]  [place] [address]  Phone: [phone]  Fax: [phone]  Follow Up Time: 1 week    Pam Smith)  Hematology; Internal Medicine; Medical Oncology  [address] [place] [address]  Phone: [phone]  Fax: [phone]  Follow Up Time: 1 week    Negrita Haque)  Medicine  Dermatology  [address], [address]  [place] [address]  Phone: [phone]  Fax: [phone]  Follow Up Time:     Todd Curry)  Internal Medicine  [address] [place] [address]  Phone: [phone]  Fax: [phone]  Follow Up Time: 2 weeks

## 2021-03-05 ENCOUNTER — TRANSCRIPTION ENCOUNTER (OUTPATIENT)
Age: 81
End: 2021-03-05

## 2021-03-05 VITALS
OXYGEN SATURATION: 97 % | DIASTOLIC BLOOD PRESSURE: 70 MMHG | SYSTOLIC BLOOD PRESSURE: 145 MMHG | RESPIRATION RATE: 18 BRPM | HEART RATE: 84 BPM | TEMPERATURE: 97 F

## 2021-03-05 LAB
ANION GAP SERPL CALC-SCNC: 13 MMOL/L — SIGNIFICANT CHANGE UP (ref 5–17)
BASOPHILS # BLD AUTO: 0.01 K/UL — SIGNIFICANT CHANGE UP (ref 0–0.2)
BASOPHILS NFR BLD AUTO: 2 % — SIGNIFICANT CHANGE UP (ref 0–2)
BUN SERPL-MCNC: 12 MG/DL — SIGNIFICANT CHANGE UP (ref 7–23)
CALCIUM SERPL-MCNC: 9.4 MG/DL — SIGNIFICANT CHANGE UP (ref 8.4–10.5)
CHLORIDE SERPL-SCNC: 103 MMOL/L — SIGNIFICANT CHANGE UP (ref 96–108)
CO2 SERPL-SCNC: 25 MMOL/L — SIGNIFICANT CHANGE UP (ref 22–31)
CREAT SERPL-MCNC: 0.67 MG/DL — SIGNIFICANT CHANGE UP (ref 0.5–1.3)
CULTURE RESULTS: SIGNIFICANT CHANGE UP
EOSINOPHIL # BLD AUTO: 0.02 K/UL — SIGNIFICANT CHANGE UP (ref 0–0.5)
EOSINOPHIL NFR BLD AUTO: 4 % — SIGNIFICANT CHANGE UP (ref 0–6)
GIANT PLATELETS BLD QL SMEAR: PRESENT — SIGNIFICANT CHANGE UP
GLUCOSE SERPL-MCNC: 90 MG/DL — SIGNIFICANT CHANGE UP (ref 70–99)
HCT VFR BLD CALC: 24.8 % — LOW (ref 34.5–45)
HGB BLD-MCNC: 7.8 G/DL — LOW (ref 11.5–15.5)
LYMPHOCYTES # BLD AUTO: 0.32 K/UL — LOW (ref 1–3.3)
LYMPHOCYTES # BLD AUTO: 60 % — HIGH (ref 13–44)
MANUAL SMEAR VERIFICATION: SIGNIFICANT CHANGE UP
MCHC RBC-ENTMCNC: 31.1 PG — SIGNIFICANT CHANGE UP (ref 27–34)
MCHC RBC-ENTMCNC: 31.5 GM/DL — LOW (ref 32–36)
MCV RBC AUTO: 98.8 FL — SIGNIFICANT CHANGE UP (ref 80–100)
MONOCYTES # BLD AUTO: 0 K/UL — SIGNIFICANT CHANGE UP (ref 0–0.9)
MONOCYTES NFR BLD AUTO: 0 % — LOW (ref 2–14)
NEUTROPHILS # BLD AUTO: 0.18 K/UL — LOW (ref 1.8–7.4)
NEUTROPHILS NFR BLD AUTO: 30 % — LOW (ref 43–77)
NEUTS BAND # BLD: 4 % — SIGNIFICANT CHANGE UP (ref 0–8)
NRBC # BLD: 2 /100 — HIGH (ref 0–0)
PLAT MORPH BLD: ABNORMAL
PLATELET # BLD AUTO: 185 K/UL — SIGNIFICANT CHANGE UP (ref 150–400)
POTASSIUM SERPL-MCNC: 4.1 MMOL/L — SIGNIFICANT CHANGE UP (ref 3.5–5.3)
POTASSIUM SERPL-SCNC: 4.1 MMOL/L — SIGNIFICANT CHANGE UP (ref 3.5–5.3)
RBC # BLD: 2.51 M/UL — LOW (ref 3.8–5.2)
RBC # FLD: 16.6 % — HIGH (ref 10.3–14.5)
RBC BLD AUTO: SIGNIFICANT CHANGE UP
SODIUM SERPL-SCNC: 141 MMOL/L — SIGNIFICANT CHANGE UP (ref 135–145)
SPECIMEN SOURCE: SIGNIFICANT CHANGE UP
TOXIC GRANULES BLD QL SMEAR: PRESENT — SIGNIFICANT CHANGE UP
WBC # BLD: 0.54 K/UL — CRITICAL LOW (ref 3.8–10.5)
WBC # FLD AUTO: 0.54 K/UL — CRITICAL LOW (ref 3.8–10.5)

## 2021-03-05 PROCEDURE — 99232 SBSQ HOSP IP/OBS MODERATE 35: CPT | Mod: GC

## 2021-03-05 RX ORDER — CHOLECALCIFEROL (VITAMIN D3) 125 MCG
1000 CAPSULE ORAL
Qty: 0 | Refills: 0 | DISCHARGE
Start: 2021-03-05

## 2021-03-05 RX ORDER — PANTOPRAZOLE SODIUM 20 MG/1
1 TABLET, DELAYED RELEASE ORAL
Qty: 0 | Refills: 0 | DISCHARGE
Start: 2021-03-05

## 2021-03-05 RX ORDER — CHOLECALCIFEROL (VITAMIN D3) 125 MCG
1000 CAPSULE ORAL
Qty: 30 | Refills: 0
Start: 2021-03-05 | End: 2021-04-03

## 2021-03-05 RX ORDER — VORICONAZOLE 10 MG/ML
1 INJECTION, POWDER, LYOPHILIZED, FOR SOLUTION INTRAVENOUS
Qty: 0 | Refills: 0 | DISCHARGE

## 2021-03-05 RX ORDER — OMEPRAZOLE 10 MG/1
1 CAPSULE, DELAYED RELEASE ORAL
Qty: 0 | Refills: 0 | DISCHARGE

## 2021-03-05 RX ORDER — PANTOPRAZOLE SODIUM 20 MG/1
1 TABLET, DELAYED RELEASE ORAL
Qty: 30 | Refills: 0
Start: 2021-03-05 | End: 2021-04-03

## 2021-03-05 RX ORDER — POLYETHYLENE GLYCOL 3350 17 G/17G
17 POWDER, FOR SOLUTION ORAL
Qty: 1020 | Refills: 0
Start: 2021-03-05 | End: 2021-04-03

## 2021-03-05 RX ORDER — ACYCLOVIR SODIUM 500 MG
1 VIAL (EA) INTRAVENOUS
Qty: 0 | Refills: 0 | DISCHARGE
Start: 2021-03-05

## 2021-03-05 RX ORDER — SENNA PLUS 8.6 MG/1
2 TABLET ORAL
Qty: 0 | Refills: 0 | DISCHARGE
Start: 2021-03-05

## 2021-03-05 RX ORDER — CIPROFLOXACIN LACTATE 400MG/40ML
1 VIAL (ML) INTRAVENOUS
Qty: 0 | Refills: 0 | DISCHARGE

## 2021-03-05 RX ORDER — CALCIUM CARBONATE 500(1250)
1 TABLET ORAL
Qty: 0 | Refills: 0 | DISCHARGE
Start: 2021-03-05

## 2021-03-05 RX ORDER — CALCIUM CARBONATE 500(1250)
1 TABLET ORAL
Qty: 30 | Refills: 0
Start: 2021-03-05 | End: 2021-04-03

## 2021-03-05 RX ADMIN — Medication 40 MILLIGRAM(S): at 05:25

## 2021-03-05 RX ADMIN — Medication 1 APPLICATION(S): at 12:43

## 2021-03-05 RX ADMIN — OXYCODONE AND ACETAMINOPHEN 1 TABLET(S): 5; 325 TABLET ORAL at 00:59

## 2021-03-05 RX ADMIN — Medication 1000 UNIT(S): at 12:45

## 2021-03-05 RX ADMIN — POSACONAZOLE 300 MILLIGRAM(S): 100 TABLET, DELAYED RELEASE ORAL at 12:46

## 2021-03-05 RX ADMIN — Medication 400 MILLIGRAM(S): at 05:25

## 2021-03-05 RX ADMIN — PANTOPRAZOLE SODIUM 40 MILLIGRAM(S): 20 TABLET, DELAYED RELEASE ORAL at 05:29

## 2021-03-05 RX ADMIN — Medication 1 TABLET(S): at 12:46

## 2021-03-05 NOTE — PROGRESS NOTE ADULT - SUBJECTIVE AND OBJECTIVE BOX
Hematology Oncology Follow-up    INTERVAL HPI/OVERNIGHT EVENTS:  No o/n events, patient resting comfortably. No complaints at this time. Patient specifically denies fever, chills, dizziness, weakness, CP, palpitations, SOB, cough, N/V/D/C, dysuria, changes in bowel movements, LE edema.    VITAL SIGNS:  T(F): 97.6 (03-05-21 @ 09:04)  HR: 67 (03-05-21 @ 09:04)  BP: 150/76 (03-05-21 @ 09:04)  RR: 18 (03-05-21 @ 09:04)  SpO2: 98% (03-05-21 @ 09:04)  Wt(kg): --    03-04-21 @ 07:01  -  03-05-21 @ 07:00  --------------------------------------------------------  IN: 1380 mL / OUT: 0 mL / NET: 1380 mL        PHYSICAL EXAM:    Constitutional: NAD  Respiratory: CTA b/l, symmetric chest rise, with normal respiratory effort  Cardiovascular: RRR  Gastrointestinal: soft, NTND  Extremities:  no edema  MSK: no obvious abnormalities  Neurological: Grossly intact  Skin: no rash, no echymoses, no petichiae  Psych: normal affect    MEDICATIONS  (STANDING):  acyclovir   Oral Tab/Cap 400 milliGRAM(s) Oral two times a day  calcium carbonate   1250 mG (OsCal) 1 Tablet(s) Oral daily  cholecalciferol 1000 Unit(s) Oral daily  clobetasol 0.05% Ointment 1 Application(s) Topical two times a day  pantoprazole    Tablet 40 milliGRAM(s) Oral before breakfast  polyethylene glycol 3350 17 Gram(s) Oral two times a day  posaconazole DR Tablet 300 milliGRAM(s) Oral daily  predniSONE   Tablet 40 milliGRAM(s) Oral daily  senna 2 Tablet(s) Oral at bedtime  sodium chloride 0.45%. 1000 milliLiter(s) (60 mL/Hr) IV Continuous <Continuous>    MEDICATIONS  (PRN):  acetaminophen   Tablet .. 650 milliGRAM(s) Oral every 6 hours PRN Temp greater or equal to 38C (100.4F), Mild Pain (1 - 3)  oxycodone    5 mG/acetaminophen 325 mG 1 Tablet(s) Oral every 6 hours PRN Severe Pain (7 - 10)  traMADol 50 milliGRAM(s) Oral every 8 hours PRN Moderate Pain (4 - 6)      Bactrim (Rash)  dapsone (Other)  doxycycline (Rash)  sulfa drugs (Hives; Rash)      LABS:                        7.8    0.54  )-----------( 185      ( 05 Mar 2021 06:59 )             24.8     03-05    141  |  103  |  12  ----------------------------<  90  4.1   |  25  |  0.67    Ca    9.4      05 Mar 2021 06:59                       RADIOLOGY & ADDITIONAL TESTS:  Studies reviewed. Hematology Oncology Follow-up    INTERVAL HPI/OVERNIGHT EVENTS:  No acute events overnight.   Afebrile overnight.    VITAL SIGNS:  T(F): 97.6 (03-05-21 @ 09:04)  HR: 67 (03-05-21 @ 09:04)  BP: 150/76 (03-05-21 @ 09:04)  RR: 18 (03-05-21 @ 09:04)  SpO2: 98% (03-05-21 @ 09:04)  Wt(kg): --    03-04-21 @ 07:01  -  03-05-21 @ 07:00  --------------------------------------------------------  IN: 1380 mL / OUT: 0 mL / NET: 1380 mL        PHYSICAL EXAM:    Constitutional: NAD  Respiratory: CTA b/l, symmetric chest rise, with normal respiratory effort  Cardiovascular: RRR  Gastrointestinal: soft, NTND  Extremities:  no edema  MSK: no obvious abnormalities  Neurological: Grossly intact  Skin: ulcerated tender nodule over R 3rd finger  Psych: normal affect    MEDICATIONS  (STANDING):  acyclovir   Oral Tab/Cap 400 milliGRAM(s) Oral two times a day  calcium carbonate   1250 mG (OsCal) 1 Tablet(s) Oral daily  cholecalciferol 1000 Unit(s) Oral daily  clobetasol 0.05% Ointment 1 Application(s) Topical two times a day  pantoprazole    Tablet 40 milliGRAM(s) Oral before breakfast  polyethylene glycol 3350 17 Gram(s) Oral two times a day  posaconazole DR Tablet 300 milliGRAM(s) Oral daily  predniSONE   Tablet 40 milliGRAM(s) Oral daily  senna 2 Tablet(s) Oral at bedtime  sodium chloride 0.45%. 1000 milliLiter(s) (60 mL/Hr) IV Continuous <Continuous>    MEDICATIONS  (PRN):  acetaminophen   Tablet .. 650 milliGRAM(s) Oral every 6 hours PRN Temp greater or equal to 38C (100.4F), Mild Pain (1 - 3)  oxycodone    5 mG/acetaminophen 325 mG 1 Tablet(s) Oral every 6 hours PRN Severe Pain (7 - 10)  traMADol 50 milliGRAM(s) Oral every 8 hours PRN Moderate Pain (4 - 6)      Bactrim (Rash)  dapsone (Other)  doxycycline (Rash)  sulfa drugs (Hives; Rash)      LABS:                        7.8    0.54  )-----------( 185      ( 05 Mar 2021 06:59 )             24.8     03-05    141  |  103  |  12  ----------------------------<  90  4.1   |  25  |  0.67    Ca    9.4      05 Mar 2021 06:59                       RADIOLOGY & ADDITIONAL TESTS:  Studies reviewed.

## 2021-03-05 NOTE — PROGRESS NOTE ADULT - PROVIDER SPECIALTY LIST ADULT
Dermatology
Gastroenterology
Infectious Disease
Internal Medicine
Internal Medicine
Dermatology
Gastroenterology
Heme/Onc
Infectious Disease
Gastroenterology
Internal Medicine
Internal Medicine
Infectious Disease
Infectious Disease
Internal Medicine
Internal Medicine

## 2021-03-05 NOTE — PROGRESS NOTE ADULT - ATTENDING COMMENTS
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 She developed a right hand second finger lesion that was blue in coloration; attempt at I and D was not successful. 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  The patient has gradual improvement in her white cell count while on antibiotic. She is considering meeting a new primary care physician and she will coordinate care with her hematologist in [place] for follow up. Facilities at [facility] were discussed with her.

## 2021-03-05 NOTE — PROGRESS NOTE ADULT - NSHPATTENDINGPLANDISCUSS_GEN_ALL_CORE
ptn, heme, ID
ptn, heme, ID
Dermatology
ptn, heme, ID
patient and Dr VELMA Scott

## 2021-03-05 NOTE — PROGRESS NOTE ADULT - ASSESSMENT
79 yo F w/ AML, CLL, hx L breast lumpectomy p/w R 3rd digit skin lesion x 1 week. Pt reported that she noticed a skin lesion on her right middle finger about 1 week ago, which has worsened over time. Pt described the pain in the lesion as throbbing intermittent, worse when pressure is applied.     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     81 yo F w/ AML, CLL, hx L breast lumpectomy p/w R 3rd digit skin lesion x 1 week. Pt reported that she noticed a skin lesion on her right middle finger about 1 week ago, which has worsened over time. Pt described the pain in the lesion as throbbing intermittent, worse when pressure is applied.     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 King ([facility]); patient would like to possibly follow up at [facility] but will first discuss with Dr King. 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

## 2021-03-05 NOTE — PROGRESS NOTE ADULT - REASON FOR ADMISSION
neutropenic sepsis

## 2021-03-05 NOTE — PROGRESS NOTE ADULT - ASSESSMENT
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 Recommendation: good response to mag citrate.  - Trying to avoid rectal therapies d/t neutropenia.  - Continue miralax BID, titrate down for diarrhea     Problem/Recommendation - 2:  ·  Problem: Neutropenia.  Recommendation: on broad spectrum abx.      Problem/Recommendation - 3:  ·  Problem: Anemia.  Recommendation: without GI bleeding. Likely due to AML.  on PPI PPx.      Problem/Recommendation - 4:  ·  Problem: Skin lesion.  Recommendation: possible sweet syndrome s/p biopsy.      Problem/Recommendation - 5:  ·  Problem: Gluteal lesion on CT  -may require biopsy as well    The plan of care was discussed with the physician assistant and modifications were made to the notation where appropriate.   Differential diagnosis and plan of care discussed with patient after the evaluation  35 minutes spent on total encounter of which more than fifty percent of the encounter was spent counseling and/or coordinating care by the attending physician.    [facility]  Gastroenterology and Hepatology  [phone]        .     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 Recommendation: good response to mag citrate.  - Trying to avoid rectal therapies d/t neutropenia.  - Continue miralax BID, titrate down for diarrhea     Problem/Recommendation - 2:  ·  Problem: Neutropenia.  Recommendation: on broad spectrum abx.      Problem/Recommendation - 3:  ·  Problem: Anemia.  Recommendation: without GI bleeding. Likely due to AML.  on PPI PPx.      Problem/Recommendation - 4:  ·  Problem: Skin lesion.  Recommendation: possible sweet syndrome s/p biopsy.     The plan of care was discussed with the physician assistant and modifications were made to the notation where appropriate.   Differential diagnosis and plan of care discussed with patient after the evaluation  35 minutes spent on total encounter of which more than fifty percent of the encounter was spent counseling and/or coordinating care by the attending physician.    [facility]  Gastroenterology and Hepatology  [phone]        .

## 2021-03-05 NOTE — DISCHARGE NOTE NURSING/CASE MANAGEMENT/SOCIAL WORK - PATIENT PORTAL LINK FT
You can access the FollowMyHealth Patient Portal offered by [facility] by registering at the following website: [email or web address]. By joining [facility]’s FollowMyHealth portal, you will also be able to view your health information using other applications (apps) compatible with our system.

## 2021-03-05 NOTE — PROGRESS NOTE ADULT - ASSESSMENT
79 yo F w/ AML, CLL, hx L breast lumpectomy p/w R 3rd digit skin lesion x 1 week. Pt reported that she noticed a skin lesion on her right middle finger about 1 week ago, which has worsened over time. Pt described the pain in the lesion as throbbing intermittent, worse when pressure is applied.     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 Derm followed up on 3/2, ptn given IV Medrol 60 mg yesterday and today and starting on 3/4 will be on Prednisone 40 mg daily. cont topical steroid ointment: clobetasol. lesions feel better with less pain  - hemodynamically stable,   -c/w broad spectrum antibiotics , ID on board  -pancytopenia 2/2 chemotherapy. neulasta will not be administered since ptn has h/o AML.   -ANC <500. DC planning d/w ID. since ptn has been stable would recommend DC home on po LEVAQUIN with twice per week follow up for cbc, next F/U is w Dr. Hall on 3/8  -continue ppx medications, acyclovir, voriconazole   -f/u cultures all NTD  -CBC with diff daily   - "blistering lesions"  c/w SWEET SYNDROME as per derm. all nearly resolved  - wbc slowly rising, still neutropenic, HH improved post PRBC on 3/1.   - ptn also c/o painful hard subcutaneous mass over left buttock, had it on admission, but forgot to mention. s/p CT w contrast: 3x2.8 cm lesions recommend MR w contrast to R/O hematoma vs mass. on exam the mass has resolved since initiation of steroids. was  it an additional lesion which is part of SWEET SYNDROME? 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 Kristi Perry and to Dr Pam Smith respectively

## 2021-03-05 NOTE — PROGRESS NOTE ADULT - SUBJECTIVE AND OBJECTIVE BOX
INTERVAL HPI/OVERNIGHT EVENTS:  Patient seen and examined. She denies abdominal pain, n/v, melena/BRBPR  + having bms    MEDICATIONS  (STANDING):  acyclovir   Oral Tab/Cap 400 milliGRAM(s) Oral two times a day  calcium carbonate   1250 mG (OsCal) 1 Tablet(s) Oral daily  cholecalciferol 1000 Unit(s) Oral daily  clobetasol 0.05% Ointment 1 Application(s) Topical two times a day  levoFLOXacin  Tablet 500 milliGRAM(s) Oral every 24 hours  pantoprazole    Tablet 40 milliGRAM(s) Oral before breakfast  polyethylene glycol 3350 17 Gram(s) Oral two times a day  posaconazole DR Tablet 300 milliGRAM(s) Oral daily  predniSONE   Tablet 40 milliGRAM(s) Oral daily  senna 2 Tablet(s) Oral at bedtime  sodium chloride 0.45%. 1000 milliLiter(s) (60 mL/Hr) IV Continuous <Continuous>    MEDICATIONS  (PRN):  acetaminophen   Tablet .. 650 milliGRAM(s) Oral every 6 hours PRN Temp greater or equal to 38C (100.4F), Mild Pain (1 - 3)  oxycodone    5 mG/acetaminophen 325 mG 1 Tablet(s) Oral every 6 hours PRN Severe Pain (7 - 10)  traMADol 50 milliGRAM(s) Oral every 8 hours PRN Moderate Pain (4 - 6)      Allergies    Bactrim (Rash)  dapsone (Other)  doxycycline (Rash)  sulfa drugs (Hives; Rash)    Intolerances        Review of Systems:    General:  No wt loss, fevers, chills, night sweats, fatigue,   Eyes:  Good vision, no reported pain  ENT:  No sore throat, pain, runny nose, dysphagia  CV:  No pain, palpitations, hypo/hypertension  Resp:  No dyspnea, cough, tachypnea, wheezing  GI:  See HPI  :  No pain, bleeding, incontinence, nocturia  Muscle:  No pain, weakness  Neuro:  No weakness, tingling, memory problems  Psych:  No fatigue, insomnia, mood problems, depression  Endocrine:  No polyuria, polydipsia, cold/heat intolerance  Heme:  No petechiae, ecchymosis, easy bruisability  Skin:  No rash, edema        Vital Signs Last 24 Hrs  T(C): 36.3 (05 Mar 2021 12:47), Max: 36.6 (04 Mar 2021 16:01)  T(F): 97.4 (05 Mar 2021 12:47), Max: 97.9 (04 Mar 2021 16:01)  HR: 84 (05 Mar 2021 12:47) (67 - 84)  BP: 145/70 (05 Mar 2021 12:47) (123/80 - 162/83)  BP(mean): --  RR: 18 (05 Mar 2021 12:47) (18 - 18)  SpO2: 97% (05 Mar 2021 12:47) (96% - 99%)    PHYSICAL EXAM:    Constitutional: NAD, well-developed  HEENT: EOMI, throat clear  Neck: No LAD, supple  Respiratory: CTA and P  Cardiovascular: S1 and S2, RRR, no M  Gastrointestinal: BS+, soft, NT/ND, neg HSM,  Extremities: No peripheral edema, neg clubing, cyanosis  Vascular: 2+ peripheral pulses  Neurological: A/O x 3, no focal deficits  Psychiatric: Normal mood, normal affect  Skin: No rashes      LABS:                        7.8    0.54  )-----------( 185      ( 05 Mar 2021 06:59 )             24.8     03-05    141  |  103  |  12  ----------------------------<  90  4.1   |  25  |  0.67    Ca    9.4      05 Mar 2021 06:59            RADIOLOGY & ADDITIONAL TESTS:

## 2021-03-05 NOTE — PROVIDER CONTACT NOTE (CRITICAL VALUE NOTIFICATION) - ACTION/TREATMENT ORDERED:
Notified PA of critical lab values. No further orders given @ this time. Will continue to monitor.
Will monitor orders
No interventions at this time. Monitor patient.
Will monitor orders

## 2021-03-05 NOTE — PROVIDER CONTACT NOTE (CRITICAL VALUE NOTIFICATION) - ASSESSMENT
Pt A&Ox4, has no S&S of active bleeding noted.
No s.s of distress.
no diarrhea/no nausea/no vomiting

## 2021-03-05 NOTE — PROGRESS NOTE ADULT - SUBJECTIVE AND OBJECTIVE BOX
Patient is a 80y old  Female who presents with a chief complaint of neutropenic sepsis (04 Mar 2021 20:38)      SUBJECTIVE / OVERNIGHT EVENTS: no new c/o, pain free, no fever/chills    MEDICATIONS  (STANDING):  acyclovir   Oral Tab/Cap 400 milliGRAM(s) Oral two times a day  calcium carbonate   1250 mG (OsCal) 1 Tablet(s) Oral daily  cholecalciferol 1000 Unit(s) Oral daily  clobetasol 0.05% Ointment 1 Application(s) Topical two times a day  pantoprazole    Tablet 40 milliGRAM(s) Oral before breakfast  polyethylene glycol 3350 17 Gram(s) Oral two times a day  posaconazole DR Tablet 300 milliGRAM(s) Oral daily  predniSONE   Tablet 40 milliGRAM(s) Oral daily  senna 2 Tablet(s) Oral at bedtime  sodium chloride 0.45%. 1000 milliLiter(s) (60 mL/Hr) IV Continuous <Continuous>    MEDICATIONS  (PRN):  acetaminophen   Tablet .. 650 milliGRAM(s) Oral every 6 hours PRN Temp greater or equal to 38C (100.4F), Mild Pain (1 - 3)  oxycodone    5 mG/acetaminophen 325 mG 1 Tablet(s) Oral every 6 hours PRN Severe Pain (7 - 10)  traMADol 50 milliGRAM(s) Oral every 8 hours PRN Moderate Pain (4 - 6)      Vital Signs Last 24 Hrs  T(F): 97.7 (03-05-21 @ 04:11), Max: 98.1 (03-04-21 @ 12:23)  HR: 84 (03-05-21 @ 04:11) (70 - 84)  BP: 126/66 (03-05-21 @ 04:11) (123/80 - 162/83)  RR: 18 (03-05-21 @ 04:11) (18 - 18)  SpO2: 97% (03-05-21 @ 04:11) (96% - 99%)  Telemetry:   CAPILLARY BLOOD GLUCOSE        I&O's Summary    04 Mar 2021 07:01  -  05 Mar 2021 07:00  --------------------------------------------------------  IN: 1380 mL / OUT: 0 mL / NET: 1380 mL        PHYSICAL EXAM:  GENERAL: NAD, well-developed  HEAD:  Atraumatic, Normocephalic  EYES: EOMI, PERRLA, conjunctiva and sclera clear  NECK: Supple, No JVD  CHEST/LUNG: Clear to auscultation bilaterally; No wheeze  HEART: Regular rate and rhythm; No murmurs, rubs, or gallops  ABDOMEN: Soft, Nontender, Nondistended; Bowel sounds present  EXTREMITIES:  2+ Peripheral Pulses, No clubbing, cyanosis, or edema  PSYCH: AAOx3  NEUROLOGY: non-focal  SKIN: No rashes or lesions    LABS:                        7.8    0.54  )-----------( 185      ( 05 Mar 2021 06:59 )             24.8     03-05    141  |  103  |  12  ----------------------------<  90  4.1   |  25  |  0.67    Ca    9.4      05 Mar 2021 06:59                RADIOLOGY & ADDITIONAL TESTS:    Imaging Personally Reviewed:    Consultant(s) Notes Reviewed:      Care Discussed with Consultants/Other Providers:

## 2021-03-05 NOTE — PROVIDER CONTACT NOTE (CRITICAL VALUE NOTIFICATION) - BACKGROUND
Patient is admitted for cellulitis. PMH of AML and CLL
AML, CLL s/p chemo- neutropenia
AML, CLL s/p chemo- neutropenia
History of CML

## 2021-03-09 LAB — SURGICAL PATHOLOGY STUDY: SIGNIFICANT CHANGE UP

## 2021-03-16 PROCEDURE — 87075 CULTR BACTERIA EXCEPT BLOOD: CPT

## 2021-03-16 PROCEDURE — 86645 CMV ANTIBODY IGM: CPT

## 2021-03-16 PROCEDURE — 82803 BLOOD GASES ANY COMBINATION: CPT

## 2021-03-16 PROCEDURE — 86850 RBC ANTIBODY SCREEN: CPT

## 2021-03-16 PROCEDURE — 85025 COMPLETE CBC W/AUTO DIFF WBC: CPT

## 2021-03-16 PROCEDURE — 86900 BLOOD TYPING SEROLOGIC ABO: CPT

## 2021-03-16 PROCEDURE — 72197 MRI PELVIS W/O & W/DYE: CPT

## 2021-03-16 PROCEDURE — 85730 THROMBOPLASTIN TIME PARTIAL: CPT

## 2021-03-16 PROCEDURE — 86901 BLOOD TYPING SEROLOGIC RH(D): CPT

## 2021-03-16 PROCEDURE — 36430 TRANSFUSION BLD/BLD COMPNT: CPT

## 2021-03-16 PROCEDURE — 87015 SPECIMEN INFECT AGNT CONCNTJ: CPT

## 2021-03-16 PROCEDURE — 85610 PROTHROMBIN TIME: CPT

## 2021-03-16 PROCEDURE — A9585: CPT

## 2021-03-16 PROCEDURE — 86644 CMV ANTIBODY: CPT

## 2021-03-16 PROCEDURE — 84132 ASSAY OF SERUM POTASSIUM: CPT

## 2021-03-16 PROCEDURE — 85014 HEMATOCRIT: CPT

## 2021-03-16 PROCEDURE — 82784 ASSAY IGA/IGD/IGG/IGM EACH: CPT

## 2021-03-16 PROCEDURE — 87206 SMEAR FLUORESCENT/ACID STAI: CPT

## 2021-03-16 PROCEDURE — 85018 HEMOGLOBIN: CPT

## 2021-03-16 PROCEDURE — 87798 DETECT AGENT NOS DNA AMP: CPT

## 2021-03-16 PROCEDURE — 99285 EMERGENCY DEPT VISIT HI MDM: CPT

## 2021-03-16 PROCEDURE — 86753 PROTOZOA ANTIBODY NOS: CPT

## 2021-03-16 PROCEDURE — 73140 X-RAY EXAM OF FINGER(S): CPT

## 2021-03-16 PROCEDURE — 85652 RBC SED RATE AUTOMATED: CPT

## 2021-03-16 PROCEDURE — 88305 TISSUE EXAM BY PATHOLOGIST: CPT

## 2021-03-16 PROCEDURE — 83605 ASSAY OF LACTIC ACID: CPT

## 2021-03-16 PROCEDURE — 82947 ASSAY GLUCOSE BLOOD QUANT: CPT

## 2021-03-16 PROCEDURE — 80202 ASSAY OF VANCOMYCIN: CPT

## 2021-03-16 PROCEDURE — P9016: CPT

## 2021-03-16 PROCEDURE — 82435 ASSAY OF BLOOD CHLORIDE: CPT

## 2021-03-16 PROCEDURE — 96374 THER/PROPH/DIAG INJ IV PUSH: CPT

## 2021-03-16 PROCEDURE — 80053 COMPREHEN METABOLIC PANEL: CPT

## 2021-03-16 PROCEDURE — 86666 EHRLICHIA ANTIBODY: CPT

## 2021-03-16 PROCEDURE — 88342 IMHCHEM/IMCYTCHM 1ST ANTB: CPT

## 2021-03-16 PROCEDURE — 86140 C-REACTIVE PROTEIN: CPT

## 2021-03-16 PROCEDURE — 87389 HIV-1 AG W/HIV-1&-2 AB AG IA: CPT

## 2021-03-16 PROCEDURE — 86780 TREPONEMA PALLIDUM: CPT

## 2021-03-16 PROCEDURE — 72193 CT PELVIS W/DYE: CPT

## 2021-03-16 PROCEDURE — 80048 BASIC METABOLIC PNL TOTAL CA: CPT

## 2021-03-16 PROCEDURE — 86618 LYME DISEASE ANTIBODY: CPT

## 2021-03-16 PROCEDURE — 88341 IMHCHEM/IMCYTCHM EA ADD ANTB: CPT

## 2021-03-16 PROCEDURE — 88312 SPECIAL STAINS GROUP 1: CPT

## 2021-03-16 PROCEDURE — 82330 ASSAY OF CALCIUM: CPT

## 2021-03-16 PROCEDURE — 86747 PARVOVIRUS ANTIBODY: CPT

## 2021-03-16 PROCEDURE — 84295 ASSAY OF SERUM SODIUM: CPT

## 2021-03-16 PROCEDURE — 85027 COMPLETE CBC AUTOMATED: CPT

## 2021-03-16 PROCEDURE — 86923 COMPATIBILITY TEST ELECTRIC: CPT

## 2021-03-23 ENCOUNTER — APPOINTMENT (OUTPATIENT)
Dept: DERMATOLOGY | Facility: CLINIC | Age: 81
End: 2021-03-23
Payer: MEDICARE

## 2021-03-23 PROCEDURE — 99214 OFFICE O/P EST MOD 30 MIN: CPT

## 2021-03-31 LAB
CULTURE RESULTS: SIGNIFICANT CHANGE UP
SPECIMEN SOURCE: SIGNIFICANT CHANGE UP

## 2021-04-15 ENCOUNTER — APPOINTMENT (OUTPATIENT)
Dept: DERMATOLOGY | Facility: CLINIC | Age: 81
End: 2021-04-15
Payer: MEDICARE

## 2021-04-15 PROCEDURE — 99214 OFFICE O/P EST MOD 30 MIN: CPT

## 2021-04-17 RX ORDER — PREDNISONE 5 MG/1
5 TABLET ORAL
Qty: 20 | Refills: 0 | Status: ACTIVE | COMMUNITY
Start: 2021-04-17 | End: 1900-01-01

## 2021-04-17 RX ORDER — PREDNISONE 10 MG/1
10 TABLET ORAL
Qty: 30 | Refills: 0 | Status: ACTIVE | COMMUNITY
Start: 2021-04-17 | End: 1900-01-01

## 2021-04-21 LAB
CULTURE RESULTS: SIGNIFICANT CHANGE UP
SPECIMEN SOURCE: SIGNIFICANT CHANGE UP

## 2021-05-06 ENCOUNTER — APPOINTMENT (OUTPATIENT)
Dept: DERMATOLOGY | Facility: CLINIC | Age: 81
End: 2021-05-06
Payer: MEDICARE

## 2021-05-06 VITALS — HEIGHT: 66 IN | WEIGHT: 148 LBS | BODY MASS INDEX: 23.78 KG/M2

## 2021-05-06 PROCEDURE — 99214 OFFICE O/P EST MOD 30 MIN: CPT

## 2021-05-24 ENCOUNTER — APPOINTMENT (OUTPATIENT)
Dept: DERMATOLOGY | Facility: CLINIC | Age: 81
End: 2021-05-24
Payer: MEDICARE

## 2021-05-24 ENCOUNTER — LABORATORY RESULT (OUTPATIENT)
Age: 81
End: 2021-05-24

## 2021-05-24 VITALS — HEIGHT: 66 IN | BODY MASS INDEX: 23.46 KG/M2 | WEIGHT: 146 LBS

## 2021-05-24 PROCEDURE — 99214 OFFICE O/P EST MOD 30 MIN: CPT

## 2021-05-24 RX ORDER — NYSTATIN 100000 [USP'U]/ML
100000 SUSPENSION ORAL
Qty: 1 | Refills: 3 | Status: ACTIVE | COMMUNITY
Start: 2021-05-24 | End: 1900-01-01

## 2021-05-24 RX ORDER — HYDROXYZINE HYDROCHLORIDE 25 MG/1
25 TABLET ORAL
Qty: 30 | Refills: 3 | Status: ACTIVE | COMMUNITY
Start: 2021-05-24 | End: 1900-01-01

## 2021-05-24 RX ORDER — TRIAMCINOLONE ACETONIDE 1 MG/G
0.1 OINTMENT TOPICAL
Qty: 1 | Refills: 1 | Status: ACTIVE | COMMUNITY
Start: 2021-05-24 | End: 1900-01-01

## 2021-05-24 RX ORDER — CLOBETASOL PROPIONATE 0.5 MG/G
0.05 GEL TOPICAL
Qty: 1 | Refills: 1 | Status: ACTIVE | COMMUNITY
Start: 2021-05-24 | End: 1900-01-01

## 2021-05-27 ENCOUNTER — NON-APPOINTMENT (OUTPATIENT)
Age: 81
End: 2021-05-27

## 2021-06-02 ENCOUNTER — APPOINTMENT (OUTPATIENT)
Dept: DERMATOLOGY | Facility: CLINIC | Age: 81
End: 2021-06-02
Payer: MEDICARE

## 2021-06-02 DIAGNOSIS — L98.2 FEBRILE NEUTROPHILIC DERMATOSIS [SWEET]: ICD-10-CM

## 2021-06-02 PROCEDURE — 99214 OFFICE O/P EST MOD 30 MIN: CPT

## 2021-06-07 ENCOUNTER — APPOINTMENT (OUTPATIENT)
Dept: DERMATOLOGY | Facility: CLINIC | Age: 81
End: 2021-06-07

## 2021-06-17 ENCOUNTER — APPOINTMENT (OUTPATIENT)
Dept: DERMATOLOGY | Facility: CLINIC | Age: 81
End: 2021-06-17
Payer: MEDICARE

## 2021-06-17 PROCEDURE — 99214 OFFICE O/P EST MOD 30 MIN: CPT

## 2021-06-17 RX ORDER — TRIAMCINOLONE ACETONIDE 1 MG/G
0.1 CREAM TOPICAL
Qty: 1 | Refills: 0 | Status: ACTIVE | COMMUNITY
Start: 2021-06-17 | End: 1900-01-01

## 2021-06-17 RX ORDER — GABAPENTIN 300 MG/1
300 CAPSULE ORAL
Qty: 90 | Refills: 3 | Status: ACTIVE | COMMUNITY
Start: 2021-06-17 | End: 1900-01-01

## 2021-07-15 ENCOUNTER — APPOINTMENT (OUTPATIENT)
Dept: DERMATOLOGY | Facility: CLINIC | Age: 81
End: 2021-07-15
Payer: MEDICARE

## 2021-07-15 DIAGNOSIS — L30.9 DERMATITIS, UNSPECIFIED: ICD-10-CM

## 2021-07-15 PROCEDURE — 99214 OFFICE O/P EST MOD 30 MIN: CPT | Mod: GC

## 2021-07-15 RX ORDER — COLCHICINE 0.6 MG/1
0.6 CAPSULE ORAL
Qty: 60 | Refills: 2 | Status: ACTIVE | COMMUNITY
Start: 2021-03-23 | End: 1900-01-01

## 2022-04-26 ENCOUNTER — APPOINTMENT (OUTPATIENT)
Dept: ORTHOPEDIC SURGERY | Facility: CLINIC | Age: 82
End: 2022-04-26
Payer: MEDICARE

## 2022-04-26 VITALS — WEIGHT: 159 LBS | HEIGHT: 65 IN | BODY MASS INDEX: 26.49 KG/M2

## 2022-04-26 DIAGNOSIS — M54.50 LOW BACK PAIN, UNSPECIFIED: ICD-10-CM

## 2022-04-26 DIAGNOSIS — M51.36 OTHER INTERVERTEBRAL DISC DEGENERATION, LUMBAR REGION: ICD-10-CM

## 2022-04-26 PROCEDURE — 72170 X-RAY EXAM OF PELVIS: CPT

## 2022-04-26 PROCEDURE — 72100 X-RAY EXAM L-S SPINE 2/3 VWS: CPT

## 2022-04-26 PROCEDURE — 99204 OFFICE O/P NEW MOD 45 MIN: CPT

## 2022-04-26 PROCEDURE — 99214 OFFICE O/P EST MOD 30 MIN: CPT

## 2022-04-26 RX ORDER — CYCLOBENZAPRINE HYDROCHLORIDE 5 MG/1
5 TABLET, FILM COATED ORAL
Qty: 30 | Refills: 0 | Status: ACTIVE | COMMUNITY
Start: 2022-04-26 | End: 1900-01-01

## 2022-04-26 NOTE — HISTORY OF PRESENT ILLNESS
[Lower back] : lower back [Gradual] : gradual [Sharp] : sharp [Constant] : constant [Sitting] : sitting [Standing] : standing [de-identified] : Has had sharp low back pain past week, no new injury. Has had back issues in the past related to degenerative discs. Pain favors right side, goes towards hip but not down leg. Has h/o AML, just started a course of Prednisone she gets before her chemotherapy treatment, this gave some relief last night. No bowel or bladder changes [] : no [FreeTextEntry1] : EDMUNDO Leg  [FreeTextEntry3] : 4/22/22 [FreeTextEntry5] : Pt present with lower back and EDMUNDO pain. Pt reported that she has been having pain for a while , however it has gotten worst in the past week.

## 2022-04-26 NOTE — PHYSICAL EXAM
[Absent] : achilles reflex absent [Facet arthropathy] : Facet arthropathy [Disc space narrowing] : Disc space narrowing [AP] : anteroposterior [There are no fractures, subluxations or dislocations. No significant abnormalities are seen] : There are no fractures, subluxations or dislocations. No significant abnormalities are seen [] : negative sitting straight leg raise

## 2022-05-03 ENCOUNTER — APPOINTMENT (OUTPATIENT)
Dept: ORTHOPEDIC SURGERY | Facility: CLINIC | Age: 82
End: 2022-05-03
Payer: MEDICARE

## 2022-05-03 VITALS — WEIGHT: 159 LBS | HEIGHT: 65 IN | BODY MASS INDEX: 26.49 KG/M2

## 2022-05-03 DIAGNOSIS — C80.1 MALIGNANT (PRIMARY) NEOPLASM, UNSPECIFIED: ICD-10-CM

## 2022-05-03 DIAGNOSIS — I10 ESSENTIAL (PRIMARY) HYPERTENSION: ICD-10-CM

## 2022-05-03 DIAGNOSIS — M19.90 UNSPECIFIED OSTEOARTHRITIS, UNSPECIFIED SITE: ICD-10-CM

## 2022-05-03 DIAGNOSIS — M17.12 UNILATERAL PRIMARY OSTEOARTHRITIS, LEFT KNEE: ICD-10-CM

## 2022-05-03 DIAGNOSIS — M48.00 SPINAL STENOSIS, SITE UNSPECIFIED: ICD-10-CM

## 2022-05-03 PROCEDURE — 20610 DRAIN/INJ JOINT/BURSA W/O US: CPT | Mod: LT

## 2022-05-03 PROCEDURE — 99213 OFFICE O/P EST LOW 20 MIN: CPT | Mod: 25

## 2022-05-03 NOTE — PROCEDURE
[Large Joint Injection] : Large joint injection [Left] : of the left [Knee] : knee [Pain] : pain [Inflammation] : inflammation [Betadine] : betadine [Ethyl Chloride sprayed topically] : ethyl chloride sprayed topically [___ cc    1%] : Lidocaine ~Vcc of 1%  [___ cc    40mg] : Methylprednisolone (Depomedrol) ~Vcc of 40 mg  [] : Patient tolerated procedure well [Call if redness, pain or fever occur] : call if redness, pain or fever occur [Apply ice for 15min out of every hour for the next 12-24 hours as tolerated] : apply ice for 15 minutes out of every hour for the next 12-24 hours as tolerated [Risks, benefits, alternatives discussed / Verbal consent obtained] : the risks benefits, and alternatives have been discussed, and verbal consent was obtained

## 2022-05-03 NOTE — PHYSICAL EXAM
[Normal Mood and Affect] : normal mood and affect [Orientated] : orientated [Able to Communicate] : able to communicate [Well Developed] : well developed [Well Nourished] : well nourished [Left] : left knee [NL (0)] : extension 0 degrees [5___] : hamstring 5[unfilled]/5 [] : negative Lachmann [FreeTextEntry3] : ? bone spur or cyst medial knee. [TWNoteComboBox7] : flexion 135 degrees

## 2022-05-03 NOTE — HISTORY OF PRESENT ILLNESS
[9] : 9 [0] : 0 [Stabbing] : stabbing [Throbbing] : throbbing [Constant] : constant [Ice] : ice [Rest] : rest [Walking] : walking [Stairs] : stairs [de-identified] : 05/03/22:  Patient returns today with complaint of recurrent left knee pain and swelling over last few weeks after stopping Motrin due to colonoscopy. Finished her gel injections x 6 months ago w/partial relief. \par \par 10/29/21: Returns today for Euflexxa injection #3 left knee. Feeling about 20% improvement.\par \par 10/22/21: Here today for Euflexxa injection #2 left knee. \par \par 10/15/21: Return visit for a 81 year old female here today with recurrent left knee pain. She has known osteoarthritis and finished Euflexxa injections in October of 2019 and says it helped for quite some time. 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 \par \par 10/10/19 returns today feeling better. here for Euflexxa injection #2 lt knee.\par \par 10/3/19 returns today to start Euflexxa injection #1 lt knee.\par \par 9/20/19 return visit for this 80 yo female c/o recurring lt knee pain. Would like to restart Euflexxa injections.last Euflexxa injections 4/1/19.\par \par 6/13/19 Returns today still c/o lt knee pain. Going on vacation next week and would like a cortisone injection.Taking motrin 400mg prn w/o relief.\par \par 05/31/19: Returns today with a recurrent bump behind the left knee pain after finishing Euflexxa injections two months ago.Still has some slight lt knee pain walking, better after Euflexxa injections.\par PMH: Hx of CLL\par \par 04/01/19: Returns today for Euflexxa injection #3 to the left knee. feeling better.\par \par 3/26/19 Here for Euflexxa injection #2 lt knee. Tolerated prior injection well. Ibuprofen helps.\par \par 3/18/19: Here for fu LB with good response to PT. Left knee painful exacerbation, feels cyst behind the knee. Pain with ambulation when she first gets up. A day and 1/2 relief with cortisone injection last visit. Hx meniscal tear 9/2017, did well with visco series. \par \par 03/04/19: Initial visit for this 77 yo female w/ hx of spinal stenosis who noticed spon. onset of rt thigh pain x last few weeks duration. Worse walking, relieved by sitting. Also now has recurrent left knee pain for the past week and it is painful to walk on it. Is limping. Taking Motrin two tabs which does help. \par \par She reports she has had previous Orthovisc injections to the left with Dr. Wilson in October of 2017 which did help. Also has been to PT and was doing well. [] : no [FreeTextEntry1] : left knee [de-identified] : dr johnson [de-identified] : 2021

## 2022-05-03 NOTE — REVIEW OF SYSTEMS
[Joint Pain] : joint pain [Negative] : Endocrine [FreeTextEntry5] : hypertension [de-identified] : leukemia

## 2022-05-31 ENCOUNTER — APPOINTMENT (OUTPATIENT)
Dept: ORTHOPEDIC SURGERY | Facility: CLINIC | Age: 82
End: 2022-05-31

## 2022-06-28 ENCOUNTER — APPOINTMENT (OUTPATIENT)
Dept: ORTHOPEDIC SURGERY | Facility: CLINIC | Age: 82
End: 2022-06-28

## 2022-12-08 ENCOUNTER — APPOINTMENT (OUTPATIENT)
Dept: DERMATOLOGY | Facility: CLINIC | Age: 82
End: 2022-12-08

## 2022-12-08 PROCEDURE — 99214 OFFICE O/P EST MOD 30 MIN: CPT | Mod: 95

## 2022-12-08 RX ORDER — MINOCYCLINE HYDROCHLORIDE 100 MG/1
100 CAPSULE ORAL
Qty: 60 | Refills: 0 | Status: ACTIVE | COMMUNITY
Start: 2022-12-08 | End: 1900-01-01

## 2022-12-08 RX ORDER — TRIAMCINOLONE ACETONIDE 1 MG/G
0.1 PASTE DENTAL 3 TIMES DAILY
Qty: 1 | Refills: 2 | Status: ACTIVE | COMMUNITY
Start: 2022-12-08 | End: 1900-01-01

## 2022-12-20 ENCOUNTER — NON-APPOINTMENT (OUTPATIENT)
Age: 82
End: 2022-12-20

## 2022-12-20 ENCOUNTER — APPOINTMENT (OUTPATIENT)
Dept: DERMATOLOGY | Facility: CLINIC | Age: 82
End: 2022-12-20

## 2022-12-20 PROCEDURE — 99214 OFFICE O/P EST MOD 30 MIN: CPT | Mod: 95

## 2023-10-01 PROBLEM — Z92.3 HISTORY OF RADIATION THERAPY: Status: ACTIVE | Noted: 2020-12-16
